# Patient Record
Sex: FEMALE | Race: WHITE | Employment: FULL TIME | URBAN - METROPOLITAN AREA
[De-identification: names, ages, dates, MRNs, and addresses within clinical notes are randomized per-mention and may not be internally consistent; named-entity substitution may affect disease eponyms.]

---

## 2017-01-23 RX ORDER — TRAZODONE HYDROCHLORIDE 50 MG/1
TABLET ORAL
Qty: 30 TABLET | Refills: 11 | Status: SHIPPED | OUTPATIENT
Start: 2017-01-23 | End: 2017-05-26

## 2017-01-24 ENCOUNTER — TELEPHONE (OUTPATIENT)
Dept: DERMATOLOGY CLINIC | Facility: CLINIC | Age: 22
End: 2017-01-24

## 2017-01-24 RX ORDER — GLYCOPYRROLATE 1 MG/1
TABLET ORAL
Qty: 180 TABLET | Refills: 5 | Status: SHIPPED | OUTPATIENT
Start: 2017-01-24 | End: 2017-08-17

## 2017-01-24 NOTE — TELEPHONE ENCOUNTER
Pt last seen in derm 8/9/2016 for acne and hyperhidrosis. Pt asking for refills of Rubinol. May we refill?

## 2017-02-06 ENCOUNTER — PATIENT MESSAGE (OUTPATIENT)
Dept: OBGYN CLINIC | Facility: CLINIC | Age: 22
End: 2017-02-06

## 2017-02-07 NOTE — TELEPHONE ENCOUNTER
From: Joaquin Florez  To: Gagan LING Castro  Sent: 2/6/2017 8:03 PM CST  Subject: Non-Urgent Medical Question    Hi Maricel Stephens gotten my period twice in the span of two weeks now, and it's very heavy with horrible cramps.  It is also painful fee

## 2017-03-08 RX ORDER — VENLAFAXINE HYDROCHLORIDE 150 MG/1
CAPSULE, EXTENDED RELEASE ORAL
Qty: 90 CAPSULE | Refills: 3 | Status: SHIPPED | OUTPATIENT
Start: 2017-03-08 | End: 2017-09-07

## 2017-03-30 ENCOUNTER — OFFICE VISIT (OUTPATIENT)
Dept: PODIATRY CLINIC | Facility: CLINIC | Age: 22
End: 2017-03-30

## 2017-03-30 DIAGNOSIS — B07.0 PLANTAR WARTS: Primary | ICD-10-CM

## 2017-03-30 PROCEDURE — 99213 OFFICE O/P EST LOW 20 MIN: CPT

## 2017-03-30 NOTE — PROGRESS NOTES
HPI:    Patient ID: Abhinav Peña is a 24year old female. Warts  This is a new problem. The current episode started 1 to 4 weeks ago. The problem occurs constantly. The problem has been unchanged.  Pertinent negatives include no abdominal pain, c lesions on face   • Actinic Keratosis Father      possible dysplastic nevi   • Hypertension Other    • Skin cancer Other    • Dementia Other      ALZHEIMERS DISEASE   • Diabetes Other    • Breast Cancer Paternal Grandmother    • Breast Cancer Paternal Aunt and well-nourished. No distress. HENT:   Head: Normocephalic. Cardiovascular:   Pulses:       Dorsalis pedis pulses are 2+ on the right side, and 2+ on the left side. Posterior tibial pulses are 2+ on the right side, and 2+ on the left side.    Mathew Hernandez has been prepared under the direction and in the presence of Анна Savage DPM.   Electronically Signed: Cristiano De Paz, 3/30/2017, 10:18 AM.  I, Анна Savage DPM,  personally performed the services described in this documentation.  All medical record entries ma

## 2017-04-06 RX ORDER — LORAZEPAM 0.5 MG/1
TABLET ORAL
Qty: 60 TABLET | Refills: 3 | Status: SHIPPED
Start: 2017-04-06 | End: 2018-07-05

## 2017-04-06 NOTE — TELEPHONE ENCOUNTER
Imp- Anxiety - improved on venlafaxine ER 75 mg po qD, and lorazepam 0.5 mg po BID prn, refilled #60, 3RF; Rx FAXed

## 2017-04-07 RX ORDER — SUMATRIPTAN 50 MG/1
TABLET, FILM COATED ORAL
Qty: 9 TABLET | Refills: 2 | Status: SHIPPED | OUTPATIENT
Start: 2017-04-07 | End: 2017-09-13

## 2017-04-24 DIAGNOSIS — Z00.00 ANNUAL PHYSICAL EXAM: Primary | ICD-10-CM

## 2017-04-24 RX ORDER — GABAPENTIN 300 MG/1
CAPSULE ORAL
Qty: 90 CAPSULE | Refills: 0 | Status: SHIPPED | OUTPATIENT
Start: 2017-04-24 | End: 2017-05-26

## 2017-04-25 NOTE — TELEPHONE ENCOUNTER
[phone]  Pt made annual px with Dr Viridiana Colunga 5/26.  Pt needs refills and orders for blood work  To clinical

## 2017-04-27 NOTE — TELEPHONE ENCOUNTER
Patient notified that fasting labs ordered by Khushbu Robles, lab order in the system. 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  Patient will be coming in next week for nurse visit to get TB test. To  to

## 2017-05-01 ENCOUNTER — NURSE ONLY (OUTPATIENT)
Dept: INTERNAL MEDICINE CLINIC | Facility: CLINIC | Age: 22
End: 2017-05-01

## 2017-05-01 PROCEDURE — 86580 TB INTRADERMAL TEST: CPT | Performed by: INTERNAL MEDICINE

## 2017-05-23 ENCOUNTER — LAB ENCOUNTER (OUTPATIENT)
Dept: LAB | Age: 22
End: 2017-05-23
Attending: INTERNAL MEDICINE
Payer: COMMERCIAL

## 2017-05-23 DIAGNOSIS — Z00.00 ANNUAL PHYSICAL EXAM: ICD-10-CM

## 2017-05-23 PROCEDURE — 80053 COMPREHEN METABOLIC PANEL: CPT

## 2017-05-23 PROCEDURE — 84443 ASSAY THYROID STIM HORMONE: CPT

## 2017-05-23 PROCEDURE — 85025 COMPLETE CBC W/AUTO DIFF WBC: CPT

## 2017-05-23 PROCEDURE — 36415 COLL VENOUS BLD VENIPUNCTURE: CPT

## 2017-05-26 ENCOUNTER — OFFICE VISIT (OUTPATIENT)
Dept: INTERNAL MEDICINE CLINIC | Facility: CLINIC | Age: 22
End: 2017-05-26

## 2017-05-26 VITALS
TEMPERATURE: 99 F | SYSTOLIC BLOOD PRESSURE: 98 MMHG | WEIGHT: 151 LBS | BODY MASS INDEX: 25.78 KG/M2 | HEART RATE: 68 BPM | DIASTOLIC BLOOD PRESSURE: 64 MMHG | HEIGHT: 64 IN

## 2017-05-26 DIAGNOSIS — R51.9 HEADACHE, UNSPECIFIED HEADACHE TYPE: ICD-10-CM

## 2017-05-26 DIAGNOSIS — G47.00 INSOMNIA, UNSPECIFIED TYPE: ICD-10-CM

## 2017-05-26 DIAGNOSIS — Z00.00 ANNUAL PHYSICAL EXAM: Primary | ICD-10-CM

## 2017-05-26 DIAGNOSIS — K58.9 IRRITABLE BOWEL SYNDROME WITHOUT DIARRHEA: ICD-10-CM

## 2017-05-26 DIAGNOSIS — R61 HYPERHIDROSIS: ICD-10-CM

## 2017-05-26 PROCEDURE — 99395 PREV VISIT EST AGE 18-39: CPT | Performed by: INTERNAL MEDICINE

## 2017-05-26 RX ORDER — GABAPENTIN 300 MG/1
CAPSULE ORAL
Qty: 90 CAPSULE | Refills: 3 | Status: SHIPPED | OUTPATIENT
Start: 2017-05-26 | End: 2017-08-18

## 2017-05-26 RX ORDER — TRAZODONE HYDROCHLORIDE 50 MG/1
TABLET ORAL
Qty: 90 TABLET | Refills: 3 | Status: SHIPPED | OUTPATIENT
Start: 2017-05-26 | End: 2018-06-20

## 2017-05-26 NOTE — PROGRESS NOTES
Abhinav Peña is a 20year old female. HPI:   Patient presents with:  Physical  Migraine: Pt is experiencing migraine headaches and TMJ. She has amitriptyline and Imitrex. She does not feel like they are helping.        21 y/o F here for physical Problem Relation Age of Onset   • Hypertension Mother    • Cancer Father      precanc skin lesions on face   • Actinic Keratosis Father      possible dysplastic nevi   • Hypertension Other    • Skin cancer Other    • Dementia Other      ALZHEIMERS DISEAS Tab TAKE 1 TABLET BY MOUTH EVERY EVENING Disp: 30 tablet Rfl: 11       Allergies:    Dog Dander [Dander]       Reglan [Metoclopram*                ROS:   Constitutional: no weight loss; no fatigue  ENMT:  Negative for ear drainage, hearing loss and nasal d q 3 years; Rx per gynecologist Dr Jim Nolasco (sees Hien Burleson, RILEYN);    Headache  on Neurontin 300 mg po qHS, on amitriptyline 10 mg po qHS, or Imitrex 50 mg po qD prn headache; pt had allergy testing with Dr Soila Ybarra and skin testing to environmental all

## 2017-06-05 ENCOUNTER — APPOINTMENT (OUTPATIENT)
Dept: OTHER | Facility: HOSPITAL | Age: 22
End: 2017-06-05
Attending: FAMILY MEDICINE

## 2017-08-15 ENCOUNTER — PATIENT MESSAGE (OUTPATIENT)
Dept: DERMATOLOGY CLINIC | Facility: CLINIC | Age: 22
End: 2017-08-15

## 2017-08-15 NOTE — TELEPHONE ENCOUNTER
From: Efrain Houston  To: Jennifer Paulson MD  Sent: 8/15/2017 3:14 PM CDT  Subject: Prescription Question    Hi,    I am out of refills of the Glycopyrrolate and used the automated [facility]ill [facility]. I sent this early last week and have not

## 2017-08-16 NOTE — TELEPHONE ENCOUNTER
From: Sharon Hickey  Sent: 8/15/2017 9:39 PM CDT  Subject: Medication Renewal Request    Sharon Hickey would like a refill of the following medications:  glycopyrrolate 1 MG Oral Tab Cristal Gilbert MD]    Preferred pharmacy: [facility]

## 2017-08-17 RX ORDER — GLYCOPYRROLATE 1 MG/1
TABLET ORAL
Qty: 180 TABLET | Refills: 5
Start: 2017-08-17

## 2017-08-17 RX ORDER — GLYCOPYRROLATE 1 MG/1
TABLET ORAL
Qty: 180 TABLET | Refills: 0 | Status: SHIPPED | OUTPATIENT
Start: 2017-08-17 | End: 2017-09-19

## 2017-08-17 NOTE — TELEPHONE ENCOUNTER
Per LSS's original Rx that she gave written order to reorder glycopyrrolate Rx from 1/24/2017.   Rx for glycopyrrolate 1 mg tab, take 3 tablets by mouth 2 times a day, #180, 0 [facility]

## 2017-08-18 ENCOUNTER — OFFICE VISIT (OUTPATIENT)
Dept: INTERNAL MEDICINE CLINIC | Facility: CLINIC | Age: 22
End: 2017-08-18

## 2017-08-18 VITALS
HEART RATE: 80 BPM | BODY MASS INDEX: 27 KG/M2 | WEIGHT: 158 LBS | DIASTOLIC BLOOD PRESSURE: 68 MMHG | TEMPERATURE: 99 F | SYSTOLIC BLOOD PRESSURE: 94 MMHG

## 2017-08-18 DIAGNOSIS — G47.00 INSOMNIA, UNSPECIFIED TYPE: ICD-10-CM

## 2017-08-18 DIAGNOSIS — M70.50 ANSERINE BURSITIS: Primary | ICD-10-CM

## 2017-08-18 DIAGNOSIS — M79.7 FIBROMYALGIA: ICD-10-CM

## 2017-08-18 PROCEDURE — 99212 OFFICE O/P EST SF 10 MIN: CPT | Performed by: INTERNAL MEDICINE

## 2017-08-18 PROCEDURE — 99214 OFFICE O/P EST MOD 30 MIN: CPT | Performed by: INTERNAL MEDICINE

## 2017-08-18 RX ORDER — GABAPENTIN 300 MG/1
CAPSULE ORAL
Qty: 180 CAPSULE | Refills: 3 | Status: SHIPPED | OUTPATIENT
Start: 2017-08-18 | End: 2018-07-23

## 2017-08-18 NOTE — PROGRESS NOTES
Rickie Corrales is a 20year old female.     HPI:   Patient presents with:  Knee Pain      21 y/o M with 6 d h/o bilateral knee pain; pt had started running; she would run about a mile; pain in bilateral knees along medial aspect of knee; taking Aleve Smokeless tobacco: Never Used                      Comment: No household smokers.    Alcohol use: Yes           0.0 oz/week     Comment: 5 beers/month       Medications (Active prior to today's visit):    Current Outpatient [address] [place] [address] breastfeeding.   Constitutional: alert and oriented x3 in no acute distress  HEENT- EOMI, PERRL  Nose/Mouth/Throat: pharynx without erythema; no oral lesions  Neck/Thyroid: neck supple; no thyromegaly  Cardiovascular: RRR, S1, S2, no S3 or murmur  Respirato This Visit:    Signed Prescriptions Disp Refills    gabapentin 300 MG Oral Cap 180 capsule 3      Sig: TAKE TWO CAPSULES (=600mg)  BY MOUTH EVERY EVENING           Imaging & Referrals:  None     8/18/2017  Viktor Jackman MD

## 2017-09-07 ENCOUNTER — TELEPHONE (OUTPATIENT)
Dept: INTERNAL MEDICINE CLINIC | Facility: CLINIC | Age: 22
End: 2017-09-07

## 2017-09-07 RX ORDER — VENLAFAXINE HYDROCHLORIDE 225 MG/1
225 TABLET, EXTENDED RELEASE ORAL DAILY
Qty: 30 TABLET | Refills: 5 | Status: SHIPPED | OUTPATIENT
Start: 2017-09-07 | End: 2017-11-30

## 2017-09-07 NOTE — TELEPHONE ENCOUNTER
Message received via [employer]; had been on venlafaxine  mg po qD;  Imp- anxiety; rec- increase venlafaxine ER to 225 mg po qD #30, 5RF; pt called; ERx sent    -----------------------     ----- Message from Jackelin Myers to Meryle Raveling, MD

## 2017-09-14 RX ORDER — SUMATRIPTAN 50 MG/1
TABLET, FILM COATED ORAL
Qty: 9 TABLET | Refills: 3 | Status: SHIPPED | OUTPATIENT
Start: 2017-09-14 | End: 2020-05-26

## 2017-09-19 ENCOUNTER — OFFICE VISIT (OUTPATIENT)
Dept: DERMATOLOGY CLINIC | Facility: CLINIC | Age: 22
End: 2017-09-19

## 2017-09-19 DIAGNOSIS — R61 HYPERHIDROSIS: ICD-10-CM

## 2017-09-19 DIAGNOSIS — B07.0 PLANTAR WART: Primary | ICD-10-CM

## 2017-09-19 PROCEDURE — 99212 OFFICE O/P EST SF 10 MIN: CPT | Performed by: DERMATOLOGY

## 2017-09-19 RX ORDER — GLYCOPYRROLATE 1 MG/1
TABLET ORAL
Qty: 90 TABLET | Refills: 11 | Status: SHIPPED | OUTPATIENT
Start: 2017-09-19 | End: 2018-10-13

## 2017-09-19 NOTE — PROGRESS NOTES
HPI:     Chief Complaint     Derm Problem; Warts        HPI     Derm Problem    Additional comments: Patient requesting refill of glycopyrrolate           Warts    Additional comments: LOV 8/19/16. Patient with \"15\" warts to plantar feet.  Cryo done in [place] 11/13/15 Disp: 1 each Rfl: 0   Multiple Vitamin (MULTI-VITAMIN) Oral Tab Take 1 tablet by mouth daily. Disp:  Rfl:    Aspirin-Acetaminophen-Caffeine (EXCEDRIN MIGRAINE OR) Take 1 tablet by mouth as needed.  Disp:  Rfl:      Allergies:     Dog Dander Harika Bah Pt has a defibrillator No    Reaction to local anesthetic No     Social History Narrative    The patient does not use an assistive device. .      The patient does live in a home with stairs.          Family History   Problem Relation Age of Onset   • Hype

## 2017-10-04 ENCOUNTER — TELEPHONE (OUTPATIENT)
Dept: DERMATOLOGY CLINIC | Facility: CLINIC | Age: 22
End: 2017-10-04

## 2017-10-04 NOTE — TELEPHONE ENCOUNTER
RX for glycopyrrolate was just sent on 9/19/17 - confirmed with pharmacy - they will fill this for pt, no refills needed

## 2017-10-09 ENCOUNTER — PATIENT MESSAGE (OUTPATIENT)
Dept: OBGYN CLINIC | Facility: CLINIC | Age: 22
End: 2017-10-09

## 2017-10-10 ENCOUNTER — TELEPHONE (OUTPATIENT)
Dept: OBGYN CLINIC | Facility: CLINIC | Age: 22
End: 2017-10-10

## 2017-10-10 NOTE — TELEPHONE ENCOUNTER
From: Cheri Johnson  To: LING Ordaz  Sent: 10/9/2017 9:00 PM CDT  Subject: Prescription Question    Hi Rosio,    For the past six months, I have had my period almost nonstop and my hormones have been all out of whack.  I am thinking I want

## 2017-10-10 NOTE — TELEPHONE ENCOUNTER
Pt set for annual on 11/15 at 5:40pm [facility]. Would like to know if nexplanon can be removed during appt.

## 2017-10-24 ENCOUNTER — TELEPHONE (OUTPATIENT)
Dept: INTERNAL MEDICINE CLINIC | Facility: CLINIC | Age: 22
End: 2017-10-24

## 2017-10-25 NOTE — TELEPHONE ENCOUNTER
Pt reported that she has still been taking amitriptyline 10mg qHS; she does not recall discussion about stopping this medication.  She stated that she is open to stopping it as she continues to experience frequent headaches and does not think it's helping m

## 2017-10-25 NOTE — TELEPHONE ENCOUNTER
Refill request received for amitriptyline. Per 8/18/17 OV note, the pt was advised to stop amitriptyline. Attempted to call patient to clarify the refill request. Teri Morales.

## 2017-10-26 RX ORDER — AMITRIPTYLINE HYDROCHLORIDE 10 MG/1
TABLET, FILM COATED ORAL
Qty: 90 TABLET | Refills: 3 | OUTPATIENT
Start: 2017-10-26

## 2017-11-07 ENCOUNTER — TELEPHONE (OUTPATIENT)
Dept: OBGYN CLINIC | Facility: CLINIC | Age: 22
End: 2017-11-07

## 2017-11-07 NOTE — TELEPHONE ENCOUNTER
Appt for nexplanon removal scheduled for 12/4 at 1020. Pt has annual scheduled for 12/4 at 1040. LM to check [employer] for appt info.

## 2017-11-07 NOTE — TELEPHONE ENCOUNTER
Pt has a annual on 12/4 pt was wondering if she can have nexplanon removal at that appt as well. If not pt would like to know if she can be seen same day back to back. Please advise.

## 2017-11-21 ENCOUNTER — OFFICE VISIT (OUTPATIENT)
Dept: INTERNAL MEDICINE CLINIC | Facility: CLINIC | Age: 22
End: 2017-11-21

## 2017-11-21 VITALS
TEMPERATURE: 99 F | OXYGEN SATURATION: 98 % | SYSTOLIC BLOOD PRESSURE: 120 MMHG | WEIGHT: 159 LBS | HEART RATE: 77 BPM | BODY MASS INDEX: 27.14 KG/M2 | HEIGHT: 64 IN | DIASTOLIC BLOOD PRESSURE: 72 MMHG

## 2017-11-21 DIAGNOSIS — L30.9 ECZEMA, UNSPECIFIED TYPE: ICD-10-CM

## 2017-11-21 DIAGNOSIS — H60.392 OTHER INFECTIVE ACUTE OTITIS EXTERNA OF LEFT EAR: Primary | ICD-10-CM

## 2017-11-21 PROCEDURE — 99212 OFFICE O/P EST SF 10 MIN: CPT | Performed by: INTERNAL MEDICINE

## 2017-11-21 PROCEDURE — 99213 OFFICE O/P EST LOW 20 MIN: CPT | Performed by: INTERNAL MEDICINE

## 2017-11-21 RX ORDER — CIPROFLOXACIN AND DEXAMETHASONE 3; 1 MG/ML; MG/ML
4 SUSPENSION/ DROPS AURICULAR (OTIC) 2 TIMES DAILY
Qty: 1 BOTTLE | Refills: 0 | Status: SHIPPED | OUTPATIENT
Start: 2017-11-21 | End: 2018-08-21

## 2017-11-21 NOTE — PROGRESS NOTES
Sky Hernández is a 20year old female. Patient presents with:  Ear Problem: 2  days ago she reports that she took a shower and cleaned her right ear with a Q-tip and removed dry crusty blood. Also noted dry crusty blood in left ear.  reports ears feel (=600mg)  BY MOUTH EVERY EVENING Disp: 180 capsule Rfl: 3   TraZODone HCl 50 MG Oral Tab TAKE 1 TABLET BY MOUTH EVERY EVENING Disp: 90 tablet Rfl: 3   LORAZEPAM 0.5 MG Oral Tab TAKE 1 TABLET BY MOUTH TWICE A DAY AS NEEDED FOR ANXIETY Disp: 60 tablet Rfl: 3 Other    • Skin cancer Other    • Dementia Other      ALZHEIMERS DISEASE   • Diabetes Other    • Breast Cancer Paternal Aunt 54   • Cancer Paternal Uncle      TESTICULAR CANCER      Social History:   Smoking status: Never Smoker

## 2017-11-30 ENCOUNTER — OFFICE VISIT (OUTPATIENT)
Dept: OBGYN CLINIC | Facility: CLINIC | Age: 22
End: 2017-11-30

## 2017-11-30 VITALS
SYSTOLIC BLOOD PRESSURE: 116 MMHG | WEIGHT: 159.81 LBS | DIASTOLIC BLOOD PRESSURE: 84 MMHG | HEART RATE: 95 BPM | BODY MASS INDEX: 27 KG/M2

## 2017-11-30 DIAGNOSIS — Z30.09 ENCOUNTER FOR COUNSELING REGARDING CONTRACEPTION: ICD-10-CM

## 2017-11-30 DIAGNOSIS — Z01.419 WELL WOMAN EXAM WITH ROUTINE GYNECOLOGICAL EXAM: Primary | ICD-10-CM

## 2017-11-30 DIAGNOSIS — N89.8 VAGINAL ITCHING: ICD-10-CM

## 2017-11-30 PROCEDURE — 99395 PREV VISIT EST AGE 18-39: CPT | Performed by: CLINICAL NURSE SPECIALIST

## 2017-11-30 RX ORDER — FLUCONAZOLE 150 MG/1
150 TABLET ORAL ONCE
Qty: 2 TABLET | Refills: 0 | Status: SHIPPED | OUTPATIENT
Start: 2017-11-30 | End: 2017-11-30

## 2017-11-30 RX ORDER — VENLAFAXINE HYDROCHLORIDE 150 MG/1
150 CAPSULE, EXTENDED RELEASE ORAL
Refills: 3 | COMMUNITY
Start: 2017-09-02 | End: 2018-01-15 | Stop reason: DRUGHIGH

## 2017-11-30 RX ORDER — METRONIDAZOLE 7.5 MG/G
1 GEL VAGINAL NIGHTLY
Qty: 1 TUBE | Refills: 0 | Status: SHIPPED | OUTPATIENT
Start: 2017-11-30 | End: 2017-12-05

## 2017-12-08 NOTE — PROGRESS NOTES
Sharon Hickey is a 20year old female [facility] Patient's last menstrual period was 11/07/2017. Patient presents with:  Gyn Problem: discharge, itching  Gyn Exam: annual  Last annual exam was 10/13/16.  Last pap was 4/28/15 and was normal. Denies hx of status: Single  Spouse name: N/A    Years of education: N/A  Number of children: N/A     Occupational History  None on file     Social History Main Topics   Smoking status: Never Smoker    Smokeless tobacco: Never Used    Comment: No household smokers. EVERY EVENING, Disp: 90 tablet, Rfl: 3  •  LORAZEPAM 0.5 MG Oral Tab, TAKE 1 TABLET BY MOUTH TWICE A DAY AS NEEDED FOR ANXIETY, Disp: 60 tablet, Rfl: 3  •  Etonogestrel (NEXPLANON) 68 MG Subcutaneous Implant, Implanted by Savannah Gallardo on 11/13/15, Disp: 1 in size, location, without lesions and prolapse  Bladder:  No fullness, masses or tenderness  Vagina:  Normal appearance without lesions, thick yellow, clumpy discharge  Cervix:  Normal without tenderness on motion  Uterus: normal in size, contour, positio

## 2017-12-11 ENCOUNTER — NURSE ONLY (OUTPATIENT)
Dept: OBGYN CLINIC | Facility: CLINIC | Age: 22
End: 2017-12-11

## 2017-12-11 VITALS
HEART RATE: 71 BPM | DIASTOLIC BLOOD PRESSURE: 77 MMHG | SYSTOLIC BLOOD PRESSURE: 119 MMHG | WEIGHT: 158 LBS | BODY MASS INDEX: 27 KG/M2

## 2017-12-11 DIAGNOSIS — Z30.46 NEXPLANON REMOVAL: Primary | ICD-10-CM

## 2017-12-11 PROCEDURE — 11976 REMOVE CONTRACEPTIVE CAPSULE: CPT | Performed by: CLINICAL NURSE SPECIALIST

## 2017-12-11 RX ORDER — FLUCONAZOLE 150 MG/1
150 TABLET ORAL ONCE
Qty: 1 TABLET | Refills: 1 | Status: SHIPPED | OUTPATIENT
Start: 2017-12-11 | End: 2017-12-11

## 2017-12-11 NOTE — PROCEDURES
Nexplanon Insertion/Removal    Pregnancy Results: negative from {EM ENDOBX VEWV:[id number]} test   Birth control method(s) used:  ; date last used:    Consent was obtained from the patient.     Removal:  {NUMBERS 0-10:3282} % lidocaine with Epinephrine was in

## 2017-12-11 NOTE — PROCEDURES
Nexplanon Removal    Pregnancy Results: negative from urine test   Birth control method(s) used: Nexplanon   Consent was obtained from the patient.     Removal:  2 % lidocaine with Epinephrine was injected underneath the tip of the Nexplanon fazal that is con

## 2018-01-11 ENCOUNTER — TELEPHONE (OUTPATIENT)
Dept: PEDIATRICS CLINIC | Facility: CLINIC | Age: 23
End: 2018-01-11

## 2018-01-11 DIAGNOSIS — Z32.00 PREGNANCY EXAMINATION OR TEST, PREGNANCY UNCONFIRMED: Primary | ICD-10-CM

## 2018-01-11 RX ORDER — MISOPROSTOL 200 UG/1
400 TABLET ORAL ONCE
Qty: 2 TABLET | Refills: 0 | Status: SHIPPED | OUTPATIENT
Start: 2018-01-11 | End: 2018-01-11

## 2018-01-11 NOTE — TELEPHONE ENCOUNTER
Pt calling to report that she started her period last night and is calling to schedule Mirena IUD insertion with [facility].  Pt had Nexplanon removed with [facility] in December and at time of visit [facility] discussed Mirena IUD and pt was told to call on first day of cycle

## 2018-01-11 NOTE — TELEPHONE ENCOUNTER
Pt was told to call when she got her menses to insert an iud. Pt aware [facility] wont be in till Monday. Pt wondering if ok to wait till then.

## 2018-01-14 ENCOUNTER — APPOINTMENT (OUTPATIENT)
Dept: LAB | Facility: HOSPITAL | Age: 23
End: 2018-01-14
Attending: CLINICAL NURSE SPECIALIST
Payer: COMMERCIAL

## 2018-01-14 DIAGNOSIS — Z32.00 PREGNANCY EXAMINATION OR TEST, PREGNANCY UNCONFIRMED: ICD-10-CM

## 2018-01-14 LAB — HCG SERPL QL: NEGATIVE

## 2018-01-14 PROCEDURE — 36415 COLL VENOUS BLD VENIPUNCTURE: CPT

## 2018-01-14 PROCEDURE — 84703 CHORIONIC GONADOTROPIN ASSAY: CPT

## 2018-01-15 ENCOUNTER — OFFICE VISIT (OUTPATIENT)
Dept: OBGYN CLINIC | Facility: CLINIC | Age: 23
End: 2018-01-15

## 2018-01-15 VITALS
BODY MASS INDEX: 27 KG/M2 | WEIGHT: 155.81 LBS | DIASTOLIC BLOOD PRESSURE: 79 MMHG | HEART RATE: 88 BPM | SYSTOLIC BLOOD PRESSURE: 136 MMHG

## 2018-01-15 DIAGNOSIS — Z30.430 ENCOUNTER FOR INSERTION OF INTRAUTERINE CONTRACEPTIVE DEVICE: Primary | ICD-10-CM

## 2018-01-15 PROCEDURE — 58300 INSERT INTRAUTERINE DEVICE: CPT | Performed by: CLINICAL NURSE SPECIALIST

## 2018-01-15 RX ORDER — VENLAFAXINE HYDROCHLORIDE 225 MG/1
TABLET, EXTENDED RELEASE ORAL
Refills: 5 | COMMUNITY
Start: 2017-12-31 | End: 2018-03-06

## 2018-01-15 NOTE — PROCEDURES
IUD Insertion     Pregnancy Results: negative from blood test   Birth control method(s) used: CONDOMS    Consent signed. Procedure discussed with the patient in detail including indication, risks, benefits, alternatives and complications.     Pelvic Exam F

## 2018-01-23 ENCOUNTER — PATIENT MESSAGE (OUTPATIENT)
Dept: INTERNAL MEDICINE CLINIC | Facility: CLINIC | Age: 23
End: 2018-01-23

## 2018-01-23 RX ORDER — TRAZODONE HYDROCHLORIDE 50 MG/1
TABLET ORAL
Qty: 30 TABLET | Refills: 11 | OUTPATIENT
Start: 2018-01-23

## 2018-01-24 NOTE — TELEPHONE ENCOUNTER
From: Deric Orourke  To: Manuel Garza MD  Sent: 1/23/2018 7:15 PM CST  Subject: Prescription Question    Hi,    I just saw that my prescription refill request for Trazodone was denied and am wondering why.     Thank you,  Akilah Good

## 2018-01-24 NOTE — TELEPHONE ENCOUNTER
Refill request received for trazodone 50mg from [facility]. Refill auth last sent 5/26/17 to [facility] #[id number] with 3 refills:    Disp Refills Start End     TraZODone HCl 50 MG Oral Tab 90 tablet 3 5/26/2017     Sig: TAKE 1 TABLET BY MOUTH EVERY EVENING    [facility] [address]

## 2018-02-07 ENCOUNTER — OFFICE VISIT (OUTPATIENT)
Dept: GASTROENTEROLOGY | Facility: CLINIC | Age: 23
End: 2018-02-07

## 2018-02-07 ENCOUNTER — TELEPHONE (OUTPATIENT)
Dept: GASTROENTEROLOGY | Facility: CLINIC | Age: 23
End: 2018-02-07

## 2018-02-07 VITALS
BODY MASS INDEX: 26.31 KG/M2 | HEIGHT: 64.5 IN | DIASTOLIC BLOOD PRESSURE: 80 MMHG | HEART RATE: 81 BPM | WEIGHT: 156 LBS | SYSTOLIC BLOOD PRESSURE: 126 MMHG

## 2018-02-07 DIAGNOSIS — Z87.19 HISTORY OF IRRITABLE BOWEL SYNDROME: ICD-10-CM

## 2018-02-07 DIAGNOSIS — K59.09 CHRONIC CONSTIPATION: Primary | ICD-10-CM

## 2018-02-07 DIAGNOSIS — R10.84 ABDOMINAL PAIN, CHRONIC, GENERALIZED: ICD-10-CM

## 2018-02-07 DIAGNOSIS — R10.84 GENERALIZED ABDOMINAL PAIN: ICD-10-CM

## 2018-02-07 DIAGNOSIS — G89.29 ABDOMINAL PAIN, CHRONIC, GENERALIZED: ICD-10-CM

## 2018-02-07 PROCEDURE — 99244 OFF/OP CNSLTJ NEW/EST MOD 40: CPT | Performed by: INTERNAL MEDICINE

## 2018-02-07 RX ORDER — CALCIUM POLYCARBOPHIL 625 MG
TABLET ORAL
COMMUNITY
End: 2018-07-23

## 2018-02-07 NOTE — PROGRESS NOTES
HPI:    Patient ID: Cherylene Hitt is a 20year old female. HPI    Review of Systems   Constitutional: Negative for activity change, appetite change, chills, diaphoresis, fatigue, fever and unexpected weight change.    HENT: Negative for congestion 1 TABLET BY MOUTH EVERY EVENING Disp: 90 tablet Rfl: 3   LORAZEPAM 0.5 MG Oral Tab TAKE 1 TABLET BY MOUTH TWICE A DAY AS NEEDED FOR ANXIETY Disp: 60 tablet Rfl: 3   Multiple Vitamin (MULTI-VITAMIN) Oral Tab Take 1 tablet by mouth daily.  Disp:  Rfl:    Aspi warm and dry. No rash noted. She is not diaphoretic. No erythema. No pallor. Psychiatric: She has a normal mood and affect. Her behavior is normal. Judgment and thought content normal.   Nursing note and vitals reviewed.              ASSESSMENT/PLAN:   Richard

## 2018-02-07 NOTE — PATIENT INSTRUCTIONS
1.  Stop MVI. 2.  Start Miralax 1 capful bid, if not helpful then 2 caps bid    3.   Schedule colonoscopy with Golytely preparation, split dose and MAC at [facility]

## 2018-02-07 NOTE — H&P
History of Present Illness: This is a very pleasant 50-year-old female referred by Dr. Bev Alonzo as an evaluation for chronic constipation, history of IBS, and abdominal pain. The patient describes not having bowel movements for up to 3 weeks.   This is increase to 2 capsules twice daily. Tubing factors may include some of her medications including multivitamins, trazodone, and venlafaxine. I have advised her to stop the multivitamin for now.     Colonoscopy consent: I have discussed the risks, benefits,

## 2018-02-12 ENCOUNTER — OFFICE VISIT (OUTPATIENT)
Dept: OBGYN CLINIC | Facility: CLINIC | Age: 23
End: 2018-02-12

## 2018-02-12 VITALS
BODY MASS INDEX: 26 KG/M2 | HEART RATE: 87 BPM | DIASTOLIC BLOOD PRESSURE: 74 MMHG | SYSTOLIC BLOOD PRESSURE: 120 MMHG | WEIGHT: 156 LBS

## 2018-02-12 DIAGNOSIS — Z30.431 IUD CHECK UP: Primary | ICD-10-CM

## 2018-02-12 PROCEDURE — 99213 OFFICE O/P EST LOW 20 MIN: CPT | Performed by: CLINICAL NURSE SPECIALIST

## 2018-02-12 NOTE — PROGRESS NOTES
Deric Orourke is a 20year old female  No LMP recorded. Patient is not currently having periods (Reason: IUD - Intrauterine Device). Patient presents with: Follow - Up: IUD   Had Mirena placed 1/15/18. Happy with method.  Denies any pelvic pa possible dysplastic nevi   • Breast Cancer Paternal Grandmother    • Hypertension Other    • Skin cancer Other    • Dementia Other      ALZHEIMERS DISEASE   • Diabetes Other    • Breast Cancer Paternal Aunt 54   • Cancer Paternal Uncle      TESTICULAR CANC 200 MG IN 24 HRS). , Disp: 9 tablet, Rfl: 3  •  gabapentin 300 MG Oral Cap, TAKE TWO CAPSULES (=600mg)  BY MOUTH EVERY EVENING, Disp: 180 capsule, Rfl: 3  •  TraZODone HCl 50 MG Oral Tab, TAKE 1 TABLET BY MOUTH EVERY EVENING, Disp: 90 tablet, Rfl: 3  •  EMMANUEL

## 2018-02-19 ENCOUNTER — PATIENT MESSAGE (OUTPATIENT)
Dept: GASTROENTEROLOGY | Facility: CLINIC | Age: 23
End: 2018-02-19

## 2018-02-19 NOTE — TELEPHONE ENCOUNTER
From: Ina Duong  To: Kayden Urban MD  Sent: 2/19/2018 10:21 AM CST  Subject: Prescription Question    Hi,    I am scheduled for a colonoscopy on Wednesday and am just double checking that the prep was sent to my pharmacy.  I haven'

## 2018-02-20 ENCOUNTER — TELEPHONE (OUTPATIENT)
Dept: GASTROENTEROLOGY | Facility: CLINIC | Age: 23
End: 2018-02-20

## 2018-02-20 NOTE — TELEPHONE ENCOUNTER
Pharm calling to inform that the Peg-335- is not avail.  no longer has. Please send in new Rx for another med.

## 2018-02-20 NOTE — TELEPHONE ENCOUNTER
Spoke to the pharmacist. She is going to use the Colyte 236 w/flavor packs    I left a message for the pt that she can call her pharmacy to find out when the colyte is available for

## 2018-02-20 NOTE — TELEPHONE ENCOUNTER
Pt. states that the pharm is not able to fill her Rx for Colyte, as the med is no longer avail., Pt. States that a New Rx will need to be sent for another prep solution. Pt. States that she needs to start taking her prep this afternoon.

## 2018-02-21 ENCOUNTER — SURGERY (OUTPATIENT)
Age: 23
End: 2018-02-21

## 2018-02-21 ENCOUNTER — HOSPITAL ENCOUNTER (OUTPATIENT)
Age: 23
Setting detail: HOSPITAL OUTPATIENT SURGERY
Discharge: HOME | End: 2018-02-21
Attending: INTERNAL MEDICINE | Admitting: INTERNAL MEDICINE
Payer: COMMERCIAL

## 2018-02-21 ENCOUNTER — ANESTHESIA (OUTPATIENT)
Dept: ENDOSCOPY | Age: 23
End: 2018-02-21
Payer: COMMERCIAL

## 2018-02-21 ENCOUNTER — ANESTHESIA EVENT (OUTPATIENT)
Dept: ENDOSCOPY | Age: 23
End: 2018-02-21
Payer: COMMERCIAL

## 2018-02-21 VITALS
DIASTOLIC BLOOD PRESSURE: 80 MMHG | WEIGHT: 157 LBS | SYSTOLIC BLOOD PRESSURE: 120 MMHG | RESPIRATION RATE: 18 BRPM | OXYGEN SATURATION: 100 % | HEIGHT: 64 IN | BODY MASS INDEX: 26.8 KG/M2 | HEART RATE: 80 BPM

## 2018-02-21 DIAGNOSIS — K59.09 CHRONIC CONSTIPATION: ICD-10-CM

## 2018-02-21 DIAGNOSIS — R10.84 GENERALIZED ABDOMINAL PAIN: ICD-10-CM

## 2018-02-21 PROBLEM — Q43.8 TORTUOUS COLON: Status: ACTIVE | Noted: 2018-02-21

## 2018-02-21 PROBLEM — K64.8 INTERNAL HEMORRHOIDS: Status: ACTIVE | Noted: 2018-02-21

## 2018-02-21 LAB — B-HCG UR QL: NEGATIVE

## 2018-02-21 PROCEDURE — 45378 DIAGNOSTIC COLONOSCOPY: CPT | Performed by: INTERNAL MEDICINE

## 2018-02-21 RX ORDER — NALOXONE HYDROCHLORIDE 0.4 MG/ML
80 INJECTION, SOLUTION INTRAMUSCULAR; INTRAVENOUS; SUBCUTANEOUS AS NEEDED
Status: CANCELLED | OUTPATIENT
Start: 2018-02-21 | End: 2018-02-21

## 2018-02-21 RX ORDER — SODIUM CHLORIDE, SODIUM LACTATE, POTASSIUM CHLORIDE, CALCIUM CHLORIDE 600; 310; 30; 20 MG/100ML; MG/100ML; MG/100ML; MG/100ML
INJECTION, SOLUTION INTRAVENOUS CONTINUOUS
Status: CANCELLED | OUTPATIENT
Start: 2018-02-21

## 2018-02-21 NOTE — H&P
History & Physical Examination    Patient Name: Tea oJhn  MRN: [id number]  [facility]: [id number]  YOB: 1995    Diagnosis: Abdominal pain, change in bowel habits, rule out IBD      Prescriptions Prior to Admission:  PEG 3350-[id number]-NaC FIBROMYALGIA.  RESOLVED WITH INCREASED WATER INTAKE   • Cyst of joint of ankle or foot     DRAINED   • Dysmenorrhea     OCP's   • Dysplastic nevus 2014    compound dysplastic nevus, mild, skin of left temporal hairline, 2014   • Fibromyalgia    • Ganglion c [facility]  2/21/2018  8:04 AM

## 2018-02-21 NOTE — OPERATIVE REPORT
[facility]    PATIENT'S NAME: Trish Myers   ATTENDING PHYSICIAN: Arnav Fontana MD   OPERATING PHYSICIAN: Arnav Fontana MD   PATIENT ACCOUNT#:   [id number]    LOCATION:  [address],[facility] 1 ENDO POOL ROOMS [address] [place] [address] internal hemorrhoids. RECOMMENDATION:    1. MiraLAX p.r.n. and Dulcolax suppositories. 2.   If it is not helpful, then consider Linzess for IBS-related constipation.     Dictated By Kian Connor MD  d: 02/21/2018 08:33:56  t: 02/21/20

## 2018-02-21 NOTE — BRIEF OP NOTE
Pre-Operative Diagnosis: Generalized abdominal pain, Change in bowel habits, rule out IBD     Post-Operative Diagnosis: Tortuous colon, internal hemorrhoids     Procedure Performed:   Procedure(s):  Colonoscopy    Surgeon(s) and Role:     * Godwin Witt

## 2018-02-21 NOTE — ANESTHESIA PREPROCEDURE EVALUATION
Anesthesia PreOp Note    HPI:     Yareli Pratt is a 20year old female who presents for preoperative consultation requested by: Brant Mcconnell MD    Date of Surgery: 2/21/2018    Procedure(s):  COLONOSCOPY  Indication: Generalized abdo 01-  2011: FOOT SURGERY Bilateral      Comment: foot aspiration of cysts on both feet  2012: OTHER SURGICAL HISTORY      Comment: ganglion cyst removal, both feet  No date: OTHER SURGICAL HISTORY Right      Comment: Right ear drum rupture      Presc possible dysplastic nevi   • Breast Cancer Paternal Grandmother    • Hypertension Other    • Skin cancer Other    • Dementia Other      ALZHEIMERS DISEASE   • Diabetes Other    • Breast Cancer Paternal Aunt 54   • Cancer Paternal Uncle      TESTICULAR CANC exam             Anesthesia Plan:   ASA:  1  Plan:   MAC  Informed Consent Plan and Risks Discussed With:  Patient  Discussed plan with:  Surgeon      I have informed Deric Orourke  of the nature of the anesthetic plan, benefits, risks, major compli

## 2018-03-01 ENCOUNTER — TELEPHONE (OUTPATIENT)
Dept: GASTROENTEROLOGY | Facility: CLINIC | Age: 23
End: 2018-03-01

## 2018-03-01 NOTE — TELEPHONE ENCOUNTER
Please inform patient that I sent her Linzess prescription. She should try it for 1 month and get back with us. She should still use MiraLAX as needed if the Linzess does not work.

## 2018-03-02 ENCOUNTER — TELEPHONE (OUTPATIENT)
Dept: GASTROENTEROLOGY | Facility: CLINIC | Age: 23
End: 2018-03-02

## 2018-03-02 NOTE — TELEPHONE ENCOUNTER
Initiated PA through [facility] with [id number] and should receive and determination within 72 hours

## 2018-03-02 NOTE — TELEPHONE ENCOUNTER
[facility] pt requesting prior auth for rx:Linzess, the ins PH#[phone] - #[phone], any questions call pharm at:[phone],thanks.     Current Outpatient Prescriptions:   •  Linaclotide (LINZESS) 145 MCG Oral Cap, Take 1 capsule

## 2018-03-02 NOTE — TELEPHONE ENCOUNTER
Spoke to pt. Relayed Dr. Dorrine Lefort message as shown below. Pt verbalized understanding of whole message and had no further questions at this time.

## 2018-03-05 NOTE — TELEPHONE ENCOUNTER
Received fax from [address] [place] [address] indicating that [place] for Steph Castillo was denied. Denial reason:  \"linzess 145 mcg capsule, use as directed (#2 capsules per day), is denied for medical necessity. Your plan allows you to receive #1 capsule per day.  Medication author

## 2018-03-06 RX ORDER — VENLAFAXINE HYDROCHLORIDE 225 MG/1
225 TABLET, EXTENDED RELEASE ORAL DAILY
Qty: 30 TABLET | Refills: 11 | Status: SHIPPED | OUTPATIENT
Start: 2018-03-06 | End: 2018-07-23

## 2018-03-06 NOTE — TELEPHONE ENCOUNTER
I spoke to the pt.  She states she got a call from [facility] stating the Annabella Reece was approved for one capsule daily

## 2018-03-07 ENCOUNTER — TELEPHONE (OUTPATIENT)
Dept: GASTROENTEROLOGY | Facility: CLINIC | Age: 23
End: 2018-03-07

## 2018-03-07 NOTE — TELEPHONE ENCOUNTER
JUSTINA RN's - this msg was sent to the scheduling pool. Re-routed to JUSTINA RN's to advise on PA for pt's Linzess. Thank you.

## 2018-03-08 NOTE — TELEPHONE ENCOUNTER
If this is non-urgent, let us wait to ask Dr. Vanessa García on her recommendation regarding this patient of hers.

## 2018-03-08 NOTE — TELEPHONE ENCOUNTER
See TE from 3/2/18 as well. Called [facility] pharmacy and spoke to Bianca. She was able to clarify for me that a PA is needed for linzess BID. I have already tried to get a PA for BID and it was denied.  Bianca was able to run it for once daily and she stated

## 2018-03-08 NOTE — TELEPHONE ENCOUNTER
Noted, message routed to Dr. David Lopez to address upon her arrival per [facility] recommendation below.

## 2018-03-26 ENCOUNTER — TELEPHONE (OUTPATIENT)
Dept: INTERNAL MEDICINE CLINIC | Facility: CLINIC | Age: 23
End: 2018-03-26

## 2018-03-26 NOTE — TELEPHONE ENCOUNTER
Pt. Has been experiencing lower abdominal pain she can only come in in the morning she works at noon. Pt. Has an appt.  On 03/29 with Dr. Chung Notice is it ok to wait  Please advise  Ph. # [phone]   Routed to clinical

## 2018-03-26 NOTE — TELEPHONE ENCOUNTER
Please advise - called patient who states she started noticing Saturday night -feeling pressure when sittin gor standing up, more in the right lower abdomen ( ovary) no cramps, its getting more persistent since Saturday , denies fever has ernie 3/29 - to

## 2018-03-26 NOTE — TELEPHONE ENCOUNTER
Left message for patient that in my opinion she should get that checked in the emergency room. I would be concerned about appendicitis or other infectious problems. I asked patient to call back the office when she receives this message.

## 2018-03-27 ENCOUNTER — HOSPITAL ENCOUNTER (EMERGENCY)
Facility: HOSPITAL | Age: 23
Discharge: HOME | End: 2018-03-28
Attending: EMERGENCY MEDICINE
Payer: COMMERCIAL

## 2018-03-27 ENCOUNTER — APPOINTMENT (OUTPATIENT)
Dept: ULTRASOUND IMAGING | Facility: HOSPITAL | Age: 23
End: 2018-03-27
Attending: EMERGENCY MEDICINE
Payer: COMMERCIAL

## 2018-03-27 DIAGNOSIS — N83.201 CYST OF RIGHT OVARY: Primary | ICD-10-CM

## 2018-03-27 LAB
ANION GAP SERPL CALC-SCNC: 10 MMOL/L (ref 0–18)
B-HCG UR QL: NEGATIVE
BASOPHILS # BLD: 0.1 K/UL (ref 0–0.2)
BASOPHILS NFR BLD: 0 %
BILIRUB UR QL: NEGATIVE
BUN SERPL-MCNC: 7 MG/DL (ref 8–20)
BUN/CREAT SERPL: 12.7 (ref 10–20)
CALCIUM SERPL-MCNC: 9.2 MG/DL (ref 8.5–10.5)
CHLORIDE SERPL-SCNC: 100 MMOL/L (ref 95–110)
CO2 SERPL-SCNC: 26 MMOL/L (ref 22–32)
COLOR UR: YELLOW
CREAT SERPL-MCNC: 0.55 MG/DL (ref 0.5–1.5)
EOSINOPHIL # BLD: 0.1 K/UL (ref 0–0.7)
EOSINOPHIL NFR BLD: 1 %
ERYTHROCYTE [DISTWIDTH] IN BLOOD BY AUTOMATED COUNT: 13 % (ref 11–15)
GLUCOSE SERPL-MCNC: 82 MG/DL (ref 70–99)
GLUCOSE UR-MCNC: NEGATIVE MG/DL
HCT VFR BLD AUTO: 40.7 % (ref 35–48)
HGB BLD-MCNC: 13.7 G/DL (ref 12–16)
HGB UR QL STRIP.AUTO: NEGATIVE
KETONES UR-MCNC: NEGATIVE MG/DL
LEUKOCYTE ESTERASE UR QL STRIP.AUTO: NEGATIVE
LYMPHOCYTES # BLD: 2.9 K/UL (ref 1–4)
LYMPHOCYTES NFR BLD: 23 %
MCH RBC QN AUTO: 29.3 PG (ref 27–32)
MCHC RBC AUTO-ENTMCNC: 33.7 G/DL (ref 32–37)
MCV RBC AUTO: 87.1 FL (ref 80–100)
MONOCYTES # BLD: 0.6 K/UL (ref 0–1)
MONOCYTES NFR BLD: 5 %
NEUTROPHILS # BLD AUTO: 8.9 K/UL (ref 1.8–7.7)
NEUTROPHILS NFR BLD: 71 %
NITRITE UR QL STRIP.AUTO: NEGATIVE
OSMOLALITY UR CALC.SUM OF ELEC: 279 MOSM/KG (ref 275–295)
PH UR: 7 [PH] (ref 5–8)
PLATELET # BLD AUTO: 321 K/UL (ref 140–400)
PMV BLD AUTO: 7.8 FL (ref 7.4–10.3)
POTASSIUM SERPL-SCNC: 3.4 MMOL/L (ref 3.3–5.1)
PROT UR-MCNC: NEGATIVE MG/DL
RBC # BLD AUTO: 4.67 M/UL (ref 3.7–5.4)
RBC #/AREA URNS AUTO: 1 /HPF
SODIUM SERPL-SCNC: 136 MMOL/L (ref 136–144)
SP GR UR STRIP: 1.02 (ref 1–1.03)
UROBILINOGEN UR STRIP-ACNC: <2
VIT C UR-MCNC: NEGATIVE MG/DL
WBC # BLD AUTO: 12.6 K/UL (ref 4–11)
WBC #/AREA URNS AUTO: 2 /HPF

## 2018-03-27 PROCEDURE — 81001 URINALYSIS AUTO W/SCOPE: CPT | Performed by: EMERGENCY MEDICINE

## 2018-03-27 PROCEDURE — 85025 COMPLETE CBC W/AUTO DIFF WBC: CPT | Performed by: EMERGENCY MEDICINE

## 2018-03-27 PROCEDURE — 93975 VASCULAR STUDY: CPT | Performed by: EMERGENCY MEDICINE

## 2018-03-27 PROCEDURE — 87808 TRICHOMONAS ASSAY W/OPTIC: CPT | Performed by: EMERGENCY MEDICINE

## 2018-03-27 PROCEDURE — 36415 COLL VENOUS BLD VENIPUNCTURE: CPT

## 2018-03-27 PROCEDURE — 87106 FUNGI IDENTIFICATION YEAST: CPT | Performed by: EMERGENCY MEDICINE

## 2018-03-27 PROCEDURE — 87591 N.GONORRHOEAE DNA AMP PROB: CPT | Performed by: EMERGENCY MEDICINE

## 2018-03-27 PROCEDURE — 87491 CHLMYD TRACH DNA AMP PROBE: CPT | Performed by: EMERGENCY MEDICINE

## 2018-03-27 PROCEDURE — 81025 URINE PREGNANCY TEST: CPT

## 2018-03-27 PROCEDURE — 76856 US EXAM PELVIC COMPLETE: CPT | Performed by: EMERGENCY MEDICINE

## 2018-03-27 PROCEDURE — 87205 SMEAR GRAM STAIN: CPT | Performed by: EMERGENCY MEDICINE

## 2018-03-27 PROCEDURE — 80048 BASIC METABOLIC PNL TOTAL CA: CPT | Performed by: EMERGENCY MEDICINE

## 2018-03-27 PROCEDURE — 76830 TRANSVAGINAL US NON-OB: CPT | Performed by: EMERGENCY MEDICINE

## 2018-03-27 PROCEDURE — 99285 EMERGENCY DEPT VISIT HI MDM: CPT

## 2018-03-27 NOTE — TELEPHONE ENCOUNTER
Received a fax from patient thru answering service. She did receive our voicemail about going to ER. She will probably go there today.

## 2018-03-27 NOTE — TELEPHONE ENCOUNTER
Spoke with patient and advised that she go to ER. Patient verbalized an understanding and will try to go later today (she is feeling better now). Explained the importance of being examined for appendicitis and patient verbalized an understanding to this.

## 2018-03-28 VITALS
DIASTOLIC BLOOD PRESSURE: 72 MMHG | HEIGHT: 64 IN | HEART RATE: 81 BPM | WEIGHT: 155 LBS | SYSTOLIC BLOOD PRESSURE: 117 MMHG | TEMPERATURE: 98 F | BODY MASS INDEX: 26.46 KG/M2 | RESPIRATION RATE: 18 BRPM | OXYGEN SATURATION: 99 %

## 2018-03-28 LAB
C TRACH DNA SPEC QL NAA+PROBE: NEGATIVE
N GONORRHOEA DNA SPEC QL NAA+PROBE: NEGATIVE

## 2018-03-28 NOTE — ED PROVIDER NOTES
Patient Seen in: [facility] AND [facility] Emergency Department    History   Patient presents with:  Abdomen/Flank Pain (GI/)    Stated Complaint: abd pain    HPI    25-year-old female without significant past medical history presents with complaints of right rupture        Smoking status: Never Smoker                                                              Smokeless tobacco: Never Used                      Comment: No household smokers.    Alcohol use: Yes           0.0 oz/week     Comment: SOCIALLY Result Value    BUN 7 (*)     All other components within normal limits   URINALYSIS WITH CULTURE REFLEX - Abnormal; Notable for the following:     Clarity Urine Cloudy (*)     Bacteria Urine Few (*)     All other components within normal limits   CBC W/ D

## 2018-03-28 NOTE — ED INITIAL ASSESSMENT (HPI)
Complaining of right lower quadrant abdominal pain beginning Saturday. Denies nausea/vomiting/diarrhea.

## 2018-03-28 NOTE — ED NOTES
Pt report RLQ abd pain since Saturday. On physical exam pt has increased tenderness to LLQ. Denies N/V/D and urinary symptoms. Pt reports vaginal discharge but states she typically has chronic yeast infections.

## 2018-03-28 NOTE — TELEPHONE ENCOUNTER
As FYI to DR. QUIROGA  - patient was in ER - has right ovarian cyst and was advised to F/U with her GYN

## 2018-03-30 LAB
GENITAL VAGINOSIS SCREEN: NEGATIVE
TRICHOMONAS SCREEN: NEGATIVE

## 2018-04-30 ENCOUNTER — APPOINTMENT (OUTPATIENT)
Dept: LAB | Facility: HOSPITAL | Age: 23
End: 2018-04-30
Attending: CLINICAL NURSE SPECIALIST
Payer: COMMERCIAL

## 2018-04-30 ENCOUNTER — OFFICE VISIT (OUTPATIENT)
Dept: OBGYN CLINIC | Facility: CLINIC | Age: 23
End: 2018-04-30

## 2018-04-30 VITALS
HEART RATE: 78 BPM | BODY MASS INDEX: 27 KG/M2 | SYSTOLIC BLOOD PRESSURE: 115 MMHG | WEIGHT: 159 LBS | DIASTOLIC BLOOD PRESSURE: 79 MMHG

## 2018-04-30 DIAGNOSIS — Z11.3 SCREENING FOR STD (SEXUALLY TRANSMITTED DISEASE): ICD-10-CM

## 2018-04-30 DIAGNOSIS — N76.0 RECURRENT VAGINITIS: ICD-10-CM

## 2018-04-30 DIAGNOSIS — N83.201 RIGHT OVARIAN CYST: ICD-10-CM

## 2018-04-30 DIAGNOSIS — Z11.3 SCREENING FOR STD (SEXUALLY TRANSMITTED DISEASE): Primary | ICD-10-CM

## 2018-04-30 PROCEDURE — 87340 HEPATITIS B SURFACE AG IA: CPT

## 2018-04-30 PROCEDURE — 36415 COLL VENOUS BLD VENIPUNCTURE: CPT

## 2018-04-30 PROCEDURE — 87389 HIV-1 AG W/HIV-1&-2 AB AG IA: CPT

## 2018-04-30 PROCEDURE — 86780 TREPONEMA PALLIDUM: CPT

## 2018-04-30 PROCEDURE — 99395 PREV VISIT EST AGE 18-39: CPT | Performed by: CLINICAL NURSE SPECIALIST

## 2018-04-30 PROCEDURE — 83036 HEMOGLOBIN GLYCOSYLATED A1C: CPT

## 2018-04-30 PROCEDURE — 86803 HEPATITIS C AB TEST: CPT

## 2018-04-30 NOTE — PROGRESS NOTES
Meng Patiño is a 24year old female  No LMP recorded. Patient is not currently having periods (Reason: IUD - Intrauterine Device).  Patient presents with:  Gyn Exam: Pt in for her annual exam. Pt also c/o a recurrent yeast infection, some ye occas tension ha and rare migraines more in past   • Insomnia     relieved with trazodone and neurontin   • Internal hemorrhoids 2/21/2018   • Labral tear of hip, degenerative     s/p repair; Dec 2016 right hip; Ortho Dr Hanna Herman   • Nevus 2016    Mid chest 53   • Cancer Paternal Uncle      TESTICULAR CANCER       MEDICATIONS:    Current Outpatient Prescriptions:   •  VENLAFAXINE HCL  MG Oral Tablet 24 Hr, TAKE 1 TABLET (225 MG TOTAL) BY MOUTH DAILY. , Disp: 30 tablet, Rfl: 11  •  glycopyrrolate 1 MG Ora lumps, or discharge. Neurological:  denies headaches, extremity weakness or numbness. Psychiatric: denies depression or anxiety. Endocrine:   denies excessive thirst or urination. Heme/Lymph:  denies history of anemia, easy bruising or bleeding. AG AB COMBO; Future  -     CHLAMYDIA/GONOCOCCUS, FREDY; Future  -     GENITAL VAGINOSIS SCREEN; Future  -     CHLAMYDIA/GONOCOCCUS, FREDY  -     GENITAL VAGINOSIS SCREEN    Right ovarian cyst  -     US PELVIS (TRANSVAGINAL PELVIS) (CPT=76830);  Future

## 2018-04-30 NOTE — PATIENT INSTRUCTIONS
Guidelines for Vulvar Skin Care    NOTE: The goal is to promote healthy vulvar skin. This is done by decreasing and removing chemicals, moisture, or rubbing (friction).  Products listed below have been suggested for use because of their past success in help \"mild\". Dove for Sensitive Skin, Neutrogena, Basis,    Aveeno, or Pears are the soaps we suggest. Do not use soap directly on the vulvar skin. Just warm water and your hand will keep the vulvar area clean without irritating the skin.   2. Do These products also help to decrease skin irritation during your period and when you urinate. If wearing wool causes you to itch, do not use A&D ointment, as it contains lanolin. 11. DO NOT DOUCHE.  Baking soda soaks or rinsing with warm water will help ri may affect the integrity of condoms when used for birth control or prevention of sexually transmitted diseases. Our experience has not found this to be a problem with vegetable-based oils.  However, the Centers for Disease Control recommends that condoms no

## 2018-05-07 ENCOUNTER — PATIENT MESSAGE (OUTPATIENT)
Dept: OBGYN CLINIC | Facility: CLINIC | Age: 23
End: 2018-05-07

## 2018-05-07 RX ORDER — FLUCONAZOLE 200 MG/1
200 TABLET ORAL ONCE
Qty: 3 TABLET | Refills: 0 | Status: SHIPPED | OUTPATIENT
Start: 2018-05-07 | End: 2018-05-07

## 2018-05-07 NOTE — TELEPHONE ENCOUNTER
I sent this as a results note, but here it is again if you didn't see it. Per pt, its ok to leave a detailed voicemail. ... Please let pt know pap shows ASCUS but negative HPV so we treat this like a normal pap.  The cells could look different since she d

## 2018-05-07 NOTE — TELEPHONE ENCOUNTER
From: Joaquin Florez  To: LING Minaya  Sent: 5/7/2018 11:50 AM CDT  Subject: Prescription Question    Jamie Avelar,    I couldn't remember if we had decided to try another round of the Diflucan or if we were going to try the extended course of

## 2018-05-07 NOTE — TELEPHONE ENCOUNTER
Message to [facility] to please review pts my chart message. Pt saw you on 4/30/18 for annual and had vaginal culture done. Vaginal culture showed 2+ candida albicans.  Your note from visit also stated \"Discussed use of Diflucan weekly to see if it helps vs Rx Te

## 2018-05-14 ENCOUNTER — HOSPITAL ENCOUNTER (OUTPATIENT)
Dept: ULTRASOUND IMAGING | Age: 23
Discharge: HOME | End: 2018-05-14
Attending: CLINICAL NURSE SPECIALIST
Payer: COMMERCIAL

## 2018-05-14 DIAGNOSIS — N83.201 RIGHT OVARIAN CYST: ICD-10-CM

## 2018-05-14 PROCEDURE — 93976 VASCULAR STUDY: CPT | Performed by: CLINICAL NURSE SPECIALIST

## 2018-05-14 PROCEDURE — 76856 US EXAM PELVIC COMPLETE: CPT | Performed by: CLINICAL NURSE SPECIALIST

## 2018-05-14 PROCEDURE — 76830 TRANSVAGINAL US NON-OB: CPT | Performed by: CLINICAL NURSE SPECIALIST

## 2018-05-15 ENCOUNTER — TELEPHONE (OUTPATIENT)
Dept: OBGYN CLINIC | Facility: CLINIC | Age: 23
End: 2018-05-15

## 2018-05-15 NOTE — TELEPHONE ENCOUNTER
----- Message from LING Goodson sent at 5/15/2018  4:33 PM CDT -----  Please let pt know large cyst has resolved so nothing needs to be done at this time. Let me know if she has any questions.      MAF

## 2018-05-25 RX ORDER — GABAPENTIN 300 MG/1
CAPSULE ORAL
Qty: 90 CAPSULE | Refills: 3 | OUTPATIENT
Start: 2018-05-25

## 2018-06-06 RX ORDER — GABAPENTIN 300 MG/1
CAPSULE ORAL
Qty: 90 CAPSULE | Refills: 3 | OUTPATIENT
Start: 2018-06-06

## 2018-06-06 NOTE — TELEPHONE ENCOUNTER
Refill request has failed the Ambulatory Medication Refill Standing Order. Last refilled on 8/18/17 by MD for a year.

## 2018-06-18 ENCOUNTER — OFFICE VISIT (OUTPATIENT)
Dept: OBGYN CLINIC | Facility: CLINIC | Age: 23
End: 2018-06-18

## 2018-06-18 VITALS
BODY MASS INDEX: 28 KG/M2 | WEIGHT: 162.19 LBS | SYSTOLIC BLOOD PRESSURE: 119 MMHG | DIASTOLIC BLOOD PRESSURE: 79 MMHG | HEART RATE: 82 BPM

## 2018-06-18 DIAGNOSIS — Z30.432 ENCOUNTER FOR REMOVAL OF INTRAUTERINE CONTRACEPTIVE DEVICE: Primary | ICD-10-CM

## 2018-06-18 PROCEDURE — 58301 REMOVE INTRAUTERINE DEVICE: CPT | Performed by: CLINICAL NURSE SPECIALIST

## 2018-06-18 RX ORDER — ALPRAZOLAM 0.25 MG/1
0.25 TABLET ORAL
COMMUNITY
Start: 2011-02-09 | End: 2018-07-23

## 2018-06-20 ENCOUNTER — TELEPHONE (OUTPATIENT)
Dept: INTERNAL MEDICINE CLINIC | Facility: CLINIC | Age: 23
End: 2018-06-20

## 2018-06-20 RX ORDER — TRAZODONE HYDROCHLORIDE 50 MG/1
TABLET ORAL
Qty: 90 TABLET | Refills: 0 | Status: SHIPPED | OUTPATIENT
Start: 2018-06-20 | End: 2018-07-23

## 2018-06-20 NOTE — TELEPHONE ENCOUNTER
Refilled trazodone 50 mg po qHS, #90, no RF; she is due for annual physical exam by Aug 2018; please notify pt

## 2018-06-20 NOTE — PROCEDURES
IUD Removal   Had ovarian cyst since IUD insertion and feels like she is getting them every month. Not happy with IUD and wants it removed. Pregnancy Results: N/a  Birth control method(s) used: Mirena (expires 1/15/2023)   Consent signed.   Procedure disc

## 2018-07-05 ENCOUNTER — TELEPHONE (OUTPATIENT)
Dept: INTERNAL MEDICINE CLINIC | Facility: CLINIC | Age: 23
End: 2018-07-05

## 2018-07-05 DIAGNOSIS — Z00.00 ANNUAL PHYSICAL EXAM: Primary | ICD-10-CM

## 2018-07-06 RX ORDER — LORAZEPAM 0.5 MG/1
TABLET ORAL
Qty: 60 TABLET | Refills: 1 | Status: SHIPPED
Start: 2018-07-06 | End: 2019-03-19

## 2018-07-06 NOTE — TELEPHONE ENCOUNTER
Refilled lorazepam 0.5 mg po BID prn, #60, 1RF; Rx FAXed to [facility] in [place]; she is due for annual physical exam in Aug 2018; please call pt and request she schedule appt; to nursing

## 2018-07-09 NOTE — TELEPHONE ENCOUNTER
To  - LMTCB to schedule appt for annual PE. Please follow up to schedule August PE appt. Thank you!!  May need fasting labs - back to nursing after appt scheduled.

## 2018-07-15 ENCOUNTER — LAB ENCOUNTER (OUTPATIENT)
Dept: LAB | Facility: HOSPITAL | Age: 23
End: 2018-07-15
Attending: INTERNAL MEDICINE
Payer: COMMERCIAL

## 2018-07-15 DIAGNOSIS — Z00.00 ANNUAL PHYSICAL EXAM: ICD-10-CM

## 2018-07-15 LAB
ALBUMIN SERPL BCP-MCNC: 4.1 G/DL (ref 3.5–4.8)
ALBUMIN/GLOB SERPL: 1.4 {RATIO} (ref 1–2)
ALP SERPL-CCNC: 57 U/L (ref 32–100)
ALT SERPL-CCNC: 21 U/L (ref 14–54)
ANION GAP SERPL CALC-SCNC: 5 MMOL/L (ref 0–18)
AST SERPL-CCNC: 19 U/L (ref 15–41)
BASOPHILS # BLD: 0 K/UL (ref 0–0.2)
BASOPHILS NFR BLD: 1 %
BILIRUB SERPL-MCNC: 0.5 MG/DL (ref 0.3–1.2)
BUN SERPL-MCNC: 8 MG/DL (ref 8–20)
BUN/CREAT SERPL: 14.5 (ref 10–20)
CALCIUM SERPL-MCNC: 9.2 MG/DL (ref 8.5–10.5)
CHLORIDE SERPL-SCNC: 104 MMOL/L (ref 95–110)
CHOLEST SERPL-MCNC: 194 MG/DL (ref 110–200)
CO2 SERPL-SCNC: 28 MMOL/L (ref 22–32)
CREAT SERPL-MCNC: 0.55 MG/DL (ref 0.5–1.5)
EOSINOPHIL # BLD: 0 K/UL (ref 0–0.7)
EOSINOPHIL NFR BLD: 1 %
ERYTHROCYTE [DISTWIDTH] IN BLOOD BY AUTOMATED COUNT: 13.1 % (ref 11–15)
GLOBULIN PLAS-MCNC: 3 G/DL (ref 2.5–3.7)
GLUCOSE SERPL-MCNC: 87 MG/DL (ref 70–99)
HCT VFR BLD AUTO: 41.2 % (ref 35–48)
HDLC SERPL-MCNC: 60 MG/DL
HGB BLD-MCNC: 13.8 G/DL (ref 12–16)
LDLC SERPL CALC-MCNC: 124 MG/DL (ref 0–99)
LYMPHOCYTES # BLD: 1.5 K/UL (ref 1–4)
LYMPHOCYTES NFR BLD: 22 %
MCH RBC QN AUTO: 29.3 PG (ref 27–32)
MCHC RBC AUTO-ENTMCNC: 33.5 G/DL (ref 32–37)
MCV RBC AUTO: 87.3 FL (ref 80–100)
MONOCYTES # BLD: 0.4 K/UL (ref 0–1)
MONOCYTES NFR BLD: 6 %
NEUTROPHILS # BLD AUTO: 4.8 K/UL (ref 1.8–7.7)
NEUTROPHILS NFR BLD: 72 %
NONHDLC SERPL-MCNC: 134 MG/DL
OSMOLALITY UR CALC.SUM OF ELEC: 282 MOSM/KG (ref 275–295)
PATIENT FASTING: YES
PLATELET # BLD AUTO: 333 K/UL (ref 140–400)
PMV BLD AUTO: 8.2 FL (ref 7.4–10.3)
POTASSIUM SERPL-SCNC: 3.7 MMOL/L (ref 3.3–5.1)
PROT SERPL-MCNC: 7.1 G/DL (ref 5.9–8.4)
RBC # BLD AUTO: 4.71 M/UL (ref 3.7–5.4)
SODIUM SERPL-SCNC: 137 MMOL/L (ref 136–144)
TRIGL SERPL-MCNC: 49 MG/DL (ref 1–149)
TSH SERPL-ACNC: 2.11 UIU/ML (ref 0.45–5.33)
WBC # BLD AUTO: 6.7 K/UL (ref 4–11)

## 2018-07-15 PROCEDURE — 36415 COLL VENOUS BLD VENIPUNCTURE: CPT

## 2018-07-15 PROCEDURE — 84443 ASSAY THYROID STIM HORMONE: CPT

## 2018-07-15 PROCEDURE — 80053 COMPREHEN METABOLIC PANEL: CPT

## 2018-07-15 PROCEDURE — 85025 COMPLETE CBC W/AUTO DIFF WBC: CPT

## 2018-07-15 PROCEDURE — 80061 LIPID PANEL: CPT

## 2018-07-23 ENCOUNTER — OFFICE VISIT (OUTPATIENT)
Dept: INTERNAL MEDICINE CLINIC | Facility: CLINIC | Age: 23
End: 2018-07-23
Payer: COMMERCIAL

## 2018-07-23 ENCOUNTER — PATIENT MESSAGE (OUTPATIENT)
Dept: OBGYN CLINIC | Facility: CLINIC | Age: 23
End: 2018-07-23

## 2018-07-23 VITALS
DIASTOLIC BLOOD PRESSURE: 76 MMHG | BODY MASS INDEX: 27.66 KG/M2 | WEIGHT: 162 LBS | HEIGHT: 64 IN | HEART RATE: 80 BPM | TEMPERATURE: 98 F | SYSTOLIC BLOOD PRESSURE: 110 MMHG

## 2018-07-23 DIAGNOSIS — N83.201 BILATERAL OVARIAN CYSTS: ICD-10-CM

## 2018-07-23 DIAGNOSIS — G47.00 INSOMNIA, UNSPECIFIED TYPE: ICD-10-CM

## 2018-07-23 DIAGNOSIS — F41.9 ANXIETY: ICD-10-CM

## 2018-07-23 DIAGNOSIS — Z00.00 ANNUAL PHYSICAL EXAM: Primary | ICD-10-CM

## 2018-07-23 DIAGNOSIS — R61 DIAPHORESIS: ICD-10-CM

## 2018-07-23 DIAGNOSIS — K58.9 IRRITABLE BOWEL SYNDROME WITHOUT DIARRHEA: ICD-10-CM

## 2018-07-23 DIAGNOSIS — R51.9 HEADACHE, UNSPECIFIED HEADACHE TYPE: ICD-10-CM

## 2018-07-23 DIAGNOSIS — N83.202 BILATERAL OVARIAN CYSTS: ICD-10-CM

## 2018-07-23 DIAGNOSIS — M79.7 FIBROMYALGIA: ICD-10-CM

## 2018-07-23 PROCEDURE — 99395 PREV VISIT EST AGE 18-39: CPT | Performed by: INTERNAL MEDICINE

## 2018-07-23 RX ORDER — TRAZODONE HYDROCHLORIDE 50 MG/1
TABLET ORAL
Qty: 30 TABLET | Refills: 11 | Status: SHIPPED | OUTPATIENT
Start: 2018-07-23 | End: 2019-08-16

## 2018-07-23 RX ORDER — GABAPENTIN 300 MG/1
CAPSULE ORAL
Qty: 60 CAPSULE | Refills: 11 | Status: SHIPPED | OUTPATIENT
Start: 2018-07-23 | End: 2019-08-15

## 2018-07-23 RX ORDER — VENLAFAXINE HYDROCHLORIDE 150 MG/1
150 TABLET, EXTENDED RELEASE ORAL DAILY
Qty: 30 TABLET | Refills: 11 | Status: SHIPPED | OUTPATIENT
Start: 2018-07-23 | End: 2019-01-21

## 2018-07-23 NOTE — PROGRESS NOTES
Sharon Hickey is a 24year old female.     HPI:   Patient presents with:  Physical: Pt needs medication refills and physical for school.       25 y/o F here for physical exam; has h/o Anxiety for which she has been taking venlafaxine  mg po qD, HISTORY Right      Comment: Right ear drum rupture   Family History   Problem Relation Age of Onset   • Hypertension Mother    • Cancer Father      precanc skin lesions on face   • Actinic Keratosis Father      possible dysplastic nevi   • Breast Cancer Pa drainage  Eyes:  Negative for eye discharge and vision loss  Cardiovascular:  Negative for chest pain; negative palpitations  Respiratory:  Negative for cough, dyspnea and wheezing  Endocrine:  Negative for abnormal sleep patterns, increased activity, poly matriculation;      Ovarian cysts  Pelvis U/S in March 2018 shows Large 6 cm slightly complicated cyst in the right ovary; repeat pelvic U/S in May 2018 showed previous 6 cm cyst in the right ovary has completely resolved, and new 3 cm simple cyst left ovar GOLD (IN TUBE) [47832] [Q]    Meds This Visit:    Signed Prescriptions Disp Refills    TraZODone HCl 50 MG Oral Tab 30 tablet 11      Sig: TAKE 1 TABLET BY MOUTH EVERY EVENING      gabapentin 300 MG Oral Cap 60 capsule 11      Sig: TAKE TWO CAPSULES (=600m

## 2018-07-23 NOTE — TELEPHONE ENCOUNTER
From: Sendy Alaniz  To: LING Paniagua  Sent: 7/23/2018 9:13 AM CDT  Subject: Visit Follow-up Question    Jamie Avelar,    I think I want to go back to the ROMA/ Cammy 9. Is it ok to make an appointment for this?      Thank you,  Ish Gipson

## 2018-07-23 NOTE — TELEPHONE ENCOUNTER
Message to [facility] to please advise pts my chart message. It appears pt had nexplanon in 2015. OK for pt to call on first day of next period to set up appt for nexplanon insertion?    Last annual: 11/2017

## 2018-07-30 ENCOUNTER — TELEPHONE (OUTPATIENT)
Dept: INTERNAL MEDICINE CLINIC | Facility: CLINIC | Age: 23
End: 2018-07-30

## 2018-07-30 DIAGNOSIS — Z23 NEED FOR VACCINATION: Primary | ICD-10-CM

## 2018-07-31 ENCOUNTER — NURSE ONLY (OUTPATIENT)
Dept: INTERNAL MEDICINE CLINIC | Facility: CLINIC | Age: 23
End: 2018-07-31
Payer: COMMERCIAL

## 2018-07-31 ENCOUNTER — TELEPHONE (OUTPATIENT)
Dept: INTERNAL MEDICINE CLINIC | Facility: CLINIC | Age: 23
End: 2018-07-31

## 2018-07-31 DIAGNOSIS — Z23 IMMUNIZATION DUE: ICD-10-CM

## 2018-07-31 LAB
HEPATITIS B SURFACE$ANTIBODY (QUANT): 31 MIU/ML
MEASLES ANTIBODY (IGG): >300 AU/ML
MUMPS VIRUS$ANTIBODY (IGG): 165 AU/ML
RUBELLA ANTIBODY (IGG): 5.39 INDEX
VARICELLA ZOSTER VIRUS$AB (IGG): >4000 INDEX

## 2018-07-31 PROCEDURE — 90471 IMMUNIZATION ADMIN: CPT | Performed by: INTERNAL MEDICINE

## 2018-07-31 PROCEDURE — 90715 TDAP VACCINE 7 YRS/> IM: CPT | Performed by: INTERNAL MEDICINE

## 2018-07-31 NOTE — TELEPHONE ENCOUNTER
Patient would like recent lab results. Please release to [employer]. Patient aware MD is out of office this week. This is not urgent.

## 2018-08-01 ENCOUNTER — TELEPHONE (OUTPATIENT)
Dept: INTERNAL MEDICINE CLINIC | Facility: CLINIC | Age: 23
End: 2018-08-01

## 2018-08-01 LAB
MITOGEN-NIL: 7.52 IU/ML
NIL: 0.04 IU/ML
QUANTIFERON(R)-TB GOLD, (INCUBATED): NEGATIVE
TB-NIL: <0 IU/ML

## 2018-08-01 NOTE — TELEPHONE ENCOUNTER
Labs 7/30 reviewed; all titers show immunity to measles, mumps, rubella, varicella zoster, and Hepatitis B; also, quantiferon gold tuberculosis test was negative; please notify pt; she will need written copies for her school's health requirement; please as

## 2018-08-03 NOTE — TELEPHONE ENCOUNTER
Patient called back - relayed DR. QUIROGA message - verbalized understanding > she will  record at  [phone]

## 2018-08-06 ENCOUNTER — OFFICE VISIT (OUTPATIENT)
Dept: OCCUPATIONAL MEDICINE | Age: 23
End: 2018-08-06
Attending: PHYSICIAN ASSISTANT

## 2018-08-21 ENCOUNTER — OFFICE VISIT (OUTPATIENT)
Dept: INTERNAL MEDICINE CLINIC | Facility: CLINIC | Age: 23
End: 2018-08-21
Payer: COMMERCIAL

## 2018-08-21 VITALS
SYSTOLIC BLOOD PRESSURE: 106 MMHG | TEMPERATURE: 99 F | WEIGHT: 163 LBS | HEART RATE: 76 BPM | HEIGHT: 64 IN | BODY MASS INDEX: 27.83 KG/M2 | DIASTOLIC BLOOD PRESSURE: 68 MMHG

## 2018-08-21 DIAGNOSIS — H60.501 ACUTE OTITIS EXTERNA OF RIGHT EAR, UNSPECIFIED TYPE: Primary | ICD-10-CM

## 2018-08-21 PROCEDURE — 99212 OFFICE O/P EST SF 10 MIN: CPT | Performed by: INTERNAL MEDICINE

## 2018-08-21 PROCEDURE — 99213 OFFICE O/P EST LOW 20 MIN: CPT | Performed by: INTERNAL MEDICINE

## 2018-08-21 RX ORDER — CIPROFLOXACIN AND DEXAMETHASONE 3; 1 MG/ML; MG/ML
4 SUSPENSION/ DROPS AURICULAR (OTIC) 2 TIMES DAILY
Qty: 1 BOTTLE | Refills: 0 | Status: SHIPPED | OUTPATIENT
Start: 2018-08-21 | End: 2018-08-26

## 2018-08-21 NOTE — PROGRESS NOTES
Jackelin Myers is a 24year old female who presents for     Pain in R ear canal started 4 days ago. felt some crustiness on inner ear canal and used a Q tip and there was red crusty material on Q tip. No fever. Hearing is ok.    Feels she has an e Other    • Skin cancer Other    • Dementia Other      ALZHEIMERS DISEASE   • Diabetes Other    • Breast Cancer Paternal Aunt 54   • Cancer Paternal Uncle      TESTICULAR CANCER      Social History:   Smoking status: Never Smoker Tablet 24 Hr Take 1 tablet (150 mg total) by mouth daily. Disp: 30 tablet Rfl: 11   LORAZEPAM 0.5 MG Oral Tab TAKE 1 TABLET BY MOUTH TWICE A DAY AS NEEDED FOR ANXIETY Disp: 60 tablet Rfl: 1   glycopyrrolate 1 MG Oral Tab TAKE 3 TABLETS BY MOUTH DAILY.  Disp

## 2018-08-23 ENCOUNTER — TELEPHONE (OUTPATIENT)
Dept: OBGYN CLINIC | Facility: CLINIC | Age: 23
End: 2018-08-23

## 2018-08-23 RX ORDER — NORETHINDRONE ACETATE AND ETHINYL ESTRADIOL AND FERROUS FUMARATE 1MG-20(24)
1 KIT ORAL DAILY
Qty: 84 TABLET | Refills: 0 | Status: SHIPPED | OUTPATIENT
Start: 2018-08-23 | End: 2018-11-14

## 2018-08-23 NOTE — TELEPHONE ENCOUNTER
See Mychart encounter dated 7/23/18. Pt would like to restart the pill she was on previously for a few months before getting nexplanon. MAF said this was ok. Need to fond out what medication pt was on. Pt states she was on Minstrin 24 FE.   Pt is asking

## 2018-08-23 NOTE — TELEPHONE ENCOUNTER
This is fine. Please remind her that the pill takes a month to be effective and she may have some BTB since its been awhile since she has been on the pill.        MAF

## 2018-08-23 NOTE — TELEPHONE ENCOUNTER
LMTCB  Want to double check what pill pt was on. Last pill in chart was minastrin 24 FE in 2015.   Aslo, does pt only want pills for a few months or until her next annual?

## 2018-08-24 ENCOUNTER — PATIENT MESSAGE (OUTPATIENT)
Dept: OBGYN CLINIC | Facility: CLINIC | Age: 23
End: 2018-08-24

## 2018-08-24 NOTE — TELEPHONE ENCOUNTER
From: Carlos Lunsford  To: LING Ansari  Sent: 8/24/2018 12:05 PM CDT  Subject: Prescription Question    Hi,  When should I start my birth control? I am on my period now.

## 2018-10-12 ENCOUNTER — TELEPHONE (OUTPATIENT)
Dept: FAMILY MEDICINE CLINIC | Facility: CLINIC | Age: 23
End: 2018-10-12

## 2018-10-12 NOTE — TELEPHONE ENCOUNTER
Current Outpatient Medications:                            glycopyrrolate 1 MG Oral Tab TAKE 3 TABLETS BY MOUTH DAILY.  Disp: 90 tablet Rfl: 11

## 2018-10-13 RX ORDER — GLYCOPYRROLATE 1 MG/1
TABLET ORAL
Qty: 90 TABLET | Refills: 1 | Status: SHIPPED | OUTPATIENT
Start: 2018-10-13 | End: 2019-01-31

## 2018-10-13 NOTE — TELEPHONE ENCOUNTER
LM on voicemail informing rx was sent to [facility] in [place] and for pt to call back if she has any questions or would like rx to be sent to a diff pharmacy.

## 2018-10-15 ENCOUNTER — TELEPHONE (OUTPATIENT)
Dept: FAMILY MEDICINE CLINIC | Facility: CLINIC | Age: 23
End: 2018-10-15

## 2018-10-15 NOTE — TELEPHONE ENCOUNTER
Current Outpatient Medications:  glycopyrrolate 1 MG Oral Tab TAKE 3 TABLETS BY MOUTH DAILY.  Disp: 90 tablet Rfl: 1

## 2018-10-15 NOTE — TELEPHONE ENCOUNTER
Pt seen 9/19/17, looks like this was just ok'ed for 1 month supply plus 1 additional refill - pharmacy asking to dispense 90 days supply for INS purposes - please advise

## 2018-10-18 ENCOUNTER — TELEPHONE (OUTPATIENT)
Dept: OBGYN CLINIC | Facility: CLINIC | Age: 23
End: 2018-10-18

## 2018-10-18 NOTE — TELEPHONE ENCOUNTER
This is fine with me but ROSEMARYK has to be ok with it. If she is not she will have to reschedule.       MAF

## 2018-10-18 NOTE — TELEPHONE ENCOUNTER
MARINO pt, states today is day 1 of her period and she would like the nexplanon inserted. Offered her appts on Monday and Wednesday of next week. Pt unable to make those appts. Pt requesting to be seen Tuesday after 4 pm. MARINO is not here Tuesday.  Pt asking if

## 2018-10-18 NOTE — TELEPHONE ENCOUNTER
Pt saw [facility] in 6/2018 for IUD removal and no mention of Nexplanon. Pt sent to [facility] message 7/23/18 \"After thinking about it further, I have decided to wait a few more months for the Nexplanon. \" Routed to [facility] Ok to schedule Nexplanon insertion or have p

## 2018-10-18 NOTE — TELEPHONE ENCOUNTER
Patient will like to know if ok to be seen by another MD to insert, also patient stating she will not be able to come in tomorrow until after 4pm, please advice.

## 2018-10-19 NOTE — TELEPHONE ENCOUNTER
Informed pt of ROSEMARYK recs below and appt on 10/23/18 was canceled. Advised pt to call day 1 of next cycle. Pt verbalized understanding.

## 2018-11-14 RX ORDER — NORETHINDRONE ACETATE AND ETHINYL ESTRADIOL AND FERROUS FUMARATE 1MG-20(24)
KIT ORAL
Qty: 84 TABLET | Refills: 0 | OUTPATIENT
Start: 2018-11-14

## 2018-11-14 NOTE — TELEPHONE ENCOUNTER
LAST ANNUAL 11-30-17, LAST PAP 4-30-18. ON 8-23-18 PT WAS GIVEN #84. NO FUTURE APPT MADE. SENT BACK RX DENIED, NEEDS APPT.

## 2018-11-15 RX ORDER — NORETHINDRONE ACETATE AND ETHINYL ESTRADIOL AND FERROUS FUMARATE 1MG-20(24)
KIT ORAL
Qty: 84 TABLET | Refills: 0 | Status: SHIPPED | OUTPATIENT
Start: 2018-11-15 | End: 2019-01-17 | Stop reason: ALTCHOICE

## 2018-11-15 NOTE — TELEPHONE ENCOUNTER
Called pt to verify if still needs OCPs refill as per several previous encounters wants Nexplanon. States just changed insurances and wants to wait until next year d/t new deductible.  Informed will review msg from [facility] from 7/2018 and if ok will send erx ot

## 2019-01-02 ENCOUNTER — OFFICE VISIT (OUTPATIENT)
Dept: OBGYN CLINIC | Facility: CLINIC | Age: 24
End: 2019-01-02
Payer: COMMERCIAL

## 2019-01-02 VITALS
HEART RATE: 77 BPM | WEIGHT: 172 LBS | DIASTOLIC BLOOD PRESSURE: 89 MMHG | SYSTOLIC BLOOD PRESSURE: 128 MMHG | BODY MASS INDEX: 30 KG/M2

## 2019-01-02 DIAGNOSIS — N89.8 VAGINAL ITCHING: ICD-10-CM

## 2019-01-02 DIAGNOSIS — Z30.09 ENCOUNTER FOR COUNSELING REGARDING CONTRACEPTION: ICD-10-CM

## 2019-01-02 DIAGNOSIS — Z01.419 WELL WOMAN EXAM WITH ROUTINE GYNECOLOGICAL EXAM: Primary | ICD-10-CM

## 2019-01-02 PROCEDURE — 99395 PREV VISIT EST AGE 18-39: CPT | Performed by: CLINICAL NURSE SPECIALIST

## 2019-01-03 NOTE — PROGRESS NOTES
Lucien Adame is a 24year old female [facility] Patient's last menstrual period was 12/10/2018. Patient presents with:  Gyn Exam: Annual.  Annual schedule is off. pts last annual and pap was 4/30/18. Pap was ASCUS but - HPV.  Has new insurance and since she occas tension ha and rare migraines more in past   • Insomnia     relieved with trazodone and neurontin   • Internal hemorrhoids 2/21/2018   • Labral tear of hip, degenerative     s/p repair; Dec 2016 right hip; Ortho Dr Perkins Mention   • Nevus 2016    Mid chest Back Care: Not Asked        Exercise: No        Bike Helmet: Not Asked        Seat Belt: Not Asked        Self-Exams: Not Asked        Grew up on a farm: Not Asked        History of tanning: Not Asked        Outdoor occupation: Not Asked        Pt has Aspirin-Acetaminophen-Caffeine (EXCEDRIN MIGRAINE OR), Take 1 tablet by mouth as needed. , Disp: , Rfl:     ALLERGIES:    Dog Dander [Dander]       Reglan [Metoclopram*          Review of Systems:  Constitutional:  Denies fatigue, night sweats, hot flashes or tenderness  Perineum: normal  Anus: no hemorroids     Assessment & Plan:  Kelsey Morgan was seen today for gyn exam.    Diagnoses and all orders for this visit:    Well woman exam with routine gynecological exam    Vaginal itching  -     GENITAL VAGINOSIS SCREE

## 2019-01-04 LAB
GENITAL VAGINOSIS SCREEN: NEGATIVE
TRICHOMONAS SCREEN: NEGATIVE

## 2019-01-10 ENCOUNTER — TELEPHONE (OUTPATIENT)
Dept: OBGYN CLINIC | Facility: CLINIC | Age: 24
End: 2019-01-10

## 2019-01-10 NOTE — TELEPHONE ENCOUNTER
Pt confirms her period started today and calling to schedule Nexplanon. Informed pt next available appt with MAF is on Wed and Thur but this will be day 7 and 8 of pt's cycle.  Informed pt message will be sent to [facility] to ask if this is okay or does pt have t

## 2019-01-14 NOTE — TELEPHONE ENCOUNTER
Wed or Thursday is fine. Will do a urine pregnancy test in the office. Tell her not to have any unprotected sex.     MAF

## 2019-01-14 NOTE — TELEPHONE ENCOUNTER
Pt informed of MAFs recs below and verbalized understanding.  Pt accepted appt with MAF on 1/17 at 4:40pm. Pt advised no unprotected IC and will get urine sample day of appt

## 2019-01-17 ENCOUNTER — OFFICE VISIT (OUTPATIENT)
Dept: OBGYN CLINIC | Facility: CLINIC | Age: 24
End: 2019-01-17
Payer: COMMERCIAL

## 2019-01-17 VITALS
DIASTOLIC BLOOD PRESSURE: 82 MMHG | SYSTOLIC BLOOD PRESSURE: 125 MMHG | HEART RATE: 72 BPM | BODY MASS INDEX: 29 KG/M2 | WEIGHT: 171.19 LBS

## 2019-01-17 DIAGNOSIS — N89.8 VAGINAL ITCHING: ICD-10-CM

## 2019-01-17 DIAGNOSIS — Z30.017 INSERTION OF IMPLANTABLE SUBDERMAL CONTRACEPTIVE: ICD-10-CM

## 2019-01-17 DIAGNOSIS — Z32.00 PREGNANCY EXAMINATION OR TEST, PREGNANCY UNCONFIRMED: Primary | ICD-10-CM

## 2019-01-17 LAB
CONTROL LINE PRESENT WITH A CLEAR BACKGROUND (YES/NO): YES YES/NO
KIT LOT #: NORMAL NUMERIC
PREGNANCY TEST, URINE: NEGATIVE

## 2019-01-17 PROCEDURE — 11981 INSERTION DRUG DLVR IMPLANT: CPT | Performed by: CLINICAL NURSE SPECIALIST

## 2019-01-17 PROCEDURE — 81025 URINE PREGNANCY TEST: CPT | Performed by: CLINICAL NURSE SPECIALIST

## 2019-01-18 NOTE — PROCEDURES
Nexplanon Insertion    Pregnancy Results: negative from urine test   Birth control method(s) used:  OCP;  Consent was obtained from the patient. Insertion:  Lot Number [id number]  The patient was positioned with her left arm flexed.     Measurement was take

## 2019-01-21 ENCOUNTER — TELEPHONE (OUTPATIENT)
Dept: INTERNAL MEDICINE CLINIC | Facility: CLINIC | Age: 24
End: 2019-01-21

## 2019-01-21 RX ORDER — VENLAFAXINE HYDROCHLORIDE 75 MG/1
75 TABLET, EXTENDED RELEASE ORAL DAILY
Qty: 30 TABLET | Refills: 3 | Status: SHIPPED | OUTPATIENT
Start: 2019-01-21 | End: 2019-08-16

## 2019-01-22 NOTE — TELEPHONE ENCOUNTER
Patient not available; advise decrease venlafaxine ER to 75 mg po qD #30 for now; LMVM; LMTCB    ----------------------  From: Raudel Wan  To: Donavon Dance, MD  Sent: 1/21/2019  2:36 PM CST  Subject: Prescription Question    Hi,    I would like t

## 2019-01-23 ENCOUNTER — OFFICE VISIT (OUTPATIENT)
Dept: INTERNAL MEDICINE CLINIC | Facility: CLINIC | Age: 24
End: 2019-01-23
Payer: COMMERCIAL

## 2019-01-23 VITALS
TEMPERATURE: 99 F | DIASTOLIC BLOOD PRESSURE: 84 MMHG | HEART RATE: 82 BPM | WEIGHT: 170 LBS | SYSTOLIC BLOOD PRESSURE: 122 MMHG | OXYGEN SATURATION: 99 % | BODY MASS INDEX: 29 KG/M2

## 2019-01-23 DIAGNOSIS — H61.22 IMPACTED CERUMEN OF LEFT EAR: Primary | ICD-10-CM

## 2019-01-23 PROCEDURE — 99213 OFFICE O/P EST LOW 20 MIN: CPT | Performed by: INTERNAL MEDICINE

## 2019-01-23 PROCEDURE — 99212 OFFICE O/P EST SF 10 MIN: CPT | Performed by: INTERNAL MEDICINE

## 2019-01-23 NOTE — PROGRESS NOTES
Bonnie Gordon is a 24year old female. Patient presents with:  Ear Problem: patient reports that she used Q-tip and feels like she pushed wax further in, feels \"swollen\" , \"pressure\" and \"blocked\".  Used hydrogen pyroxide and ear wax remover OTC with STOPPED   • Body aches     BACLOFEN TID, NO RELIEF-STOPPED. NOT FIBROMYALGIA.  RESOLVED WITH INCREASED WATER INTAKE   • Cyst of joint of ankle or foot     DRAINED   • Dysmenorrhea     OCP's   • Dysplastic nevus 2014    compound dysplastic nevus, mild, skin pain  LUNGS: denies shortness of breath with exertion, wheezing, cough, or sputum production  CARDIOVASCULAR: denies chest pain, pressure, or palpitations      EXAM:   /84 (BP Location: Right arm, Patient Position: Sitting, Cuff Size: adult)   Pulse

## 2019-01-25 ENCOUNTER — PATIENT MESSAGE (OUTPATIENT)
Dept: INTERNAL MEDICINE CLINIC | Facility: CLINIC | Age: 24
End: 2019-01-25

## 2019-01-25 RX ORDER — FLUOXETINE 10 MG/1
CAPSULE ORAL
Qty: 21 CAPSULE | Refills: 0 | Status: SHIPPED | OUTPATIENT
Start: 2019-01-25 | End: 2019-07-03

## 2019-01-25 NOTE — TELEPHONE ENCOUNTER
From: Ranulfo Jack  To: Samaria Corona MD  Sent: 1/25/2019 9:23 AM CST  Subject: Prescription Question    Hi,    Thank you for filling that prescription so quickly.  I have taken the 75mg for 3 days now and I have been feeling very dizzy and have a b

## 2019-01-25 NOTE — PROGRESS NOTES
Will change venlafaxine ER 75 mg qD to fluoxetine 20 mg po qD x7d, then 10 mg po qD x7d then stop; pt called

## 2019-01-31 ENCOUNTER — OFFICE VISIT (OUTPATIENT)
Dept: DERMATOLOGY CLINIC | Facility: CLINIC | Age: 24
End: 2019-01-31
Payer: COMMERCIAL

## 2019-01-31 DIAGNOSIS — R61 HYPERHIDROSIS: ICD-10-CM

## 2019-01-31 DIAGNOSIS — B07.0 PLANTAR WART: Primary | ICD-10-CM

## 2019-01-31 PROCEDURE — 17110 DESTRUCTION B9 LES UP TO 14: CPT | Performed by: DERMATOLOGY

## 2019-01-31 PROCEDURE — 99213 OFFICE O/P EST LOW 20 MIN: CPT | Performed by: DERMATOLOGY

## 2019-01-31 PROCEDURE — 99212 OFFICE O/P EST SF 10 MIN: CPT | Performed by: DERMATOLOGY

## 2019-01-31 RX ORDER — GLYCOPYRROLATE 1 MG/1
TABLET ORAL
Qty: 90 TABLET | Refills: 3 | Status: SHIPPED | OUTPATIENT
Start: 2019-01-31 | End: 2019-06-05

## 2019-01-31 NOTE — PROGRESS NOTES
HPI:     Chief Complaint     Warts; Derm Problem        HPI     Warts      Additional comments: LOV 9/19/2017. Pt presenting for f/u with warts to R foot. Pt currently using wart peel also previously treated with cryotherapy.               Derm Problem tablet Rfl: 1   SUMATRIPTAN SUCCINATE 50 MG Oral Tab TAKE 1 TABLET BY MOUTH EVERY 2 HOURS AS NEEDED FOR MIGRAINE (MAXIMUM 200 MG IN 24 HRS). Disp: 9 tablet Rfl: 3   Aspirin-Acetaminophen-Caffeine (EXCEDRIN MIGRAINE OR) Take 1 tablet by mouth as needed.  Dis Years of education: Not on file      Highest education level: Not on file    Social Needs      Financial resource strain: Not on file      Food insecurity - worry: Not on file      Food insecurity - inability: Not on file      Transportation needs - medica Other    • Breast Cancer Paternal Aunt 54   • Cancer Paternal Uncle         TESTICULAR CANCER       PHYSICAL EXAM:   Patient is alert and oriented and appears their stated age. They are well-nourished. Exam is remarkable for the following-  1.   There is

## 2019-03-19 ENCOUNTER — TELEPHONE (OUTPATIENT)
Dept: INTERNAL MEDICINE CLINIC | Facility: CLINIC | Age: 24
End: 2019-03-19

## 2019-03-20 RX ORDER — LORAZEPAM 0.5 MG/1
TABLET ORAL
Qty: 60 TABLET | Refills: 2 | Status: SHIPPED
Start: 2019-03-20 | End: 2019-09-17

## 2019-03-21 RX ORDER — LORAZEPAM 0.5 MG/1
TABLET ORAL
Qty: 60 TABLET | OUTPATIENT
Start: 2019-03-21

## 2019-03-21 NOTE — TELEPHONE ENCOUNTER
Current refill request refused due to refill is either a duplicate request or has active refills at the pharmacy. Check previous templates.     Requested Prescriptions     Refused Prescriptions Disp Refills   • LORAZEPAM 0.5 MG Oral Tab [Pharmacy Med Name:

## 2019-06-04 ENCOUNTER — TELEPHONE (OUTPATIENT)
Dept: DERMATOLOGY CLINIC | Facility: CLINIC | Age: 24
End: 2019-06-04

## 2019-06-04 ENCOUNTER — TELEPHONE (OUTPATIENT)
Dept: FAMILY MEDICINE CLINIC | Facility: CLINIC | Age: 24
End: 2019-06-04

## 2019-06-04 DIAGNOSIS — R61 HYPERHIDROSIS: ICD-10-CM

## 2019-06-04 NOTE — TELEPHONE ENCOUNTER
Received reill request.....     GLYCOPYRROLATE 1 MG TABLET  QTY 90.0 EA NINETY  REFILLS 3  TAKE 3 TABLETS BY MOUTH EVERY DAY    Placed in Physician mailbox

## 2019-06-05 RX ORDER — GLYCOPYRROLATE 1 MG/1
TABLET ORAL
Qty: 90 TABLET | Refills: 6 | Status: SHIPPED | OUTPATIENT
Start: 2019-06-05 | End: 2019-12-20

## 2019-07-03 ENCOUNTER — OFFICE VISIT (OUTPATIENT)
Dept: INTERNAL MEDICINE CLINIC | Facility: CLINIC | Age: 24
End: 2019-07-03
Payer: COMMERCIAL

## 2019-07-03 VITALS
BODY MASS INDEX: 29.28 KG/M2 | SYSTOLIC BLOOD PRESSURE: 132 MMHG | HEIGHT: 64 IN | TEMPERATURE: 98 F | OXYGEN SATURATION: 99 % | WEIGHT: 171.5 LBS | HEART RATE: 88 BPM | DIASTOLIC BLOOD PRESSURE: 78 MMHG

## 2019-07-03 DIAGNOSIS — L65.9 ALOPECIA: ICD-10-CM

## 2019-07-03 DIAGNOSIS — R63.5 WEIGHT GAIN: Primary | ICD-10-CM

## 2019-07-03 DIAGNOSIS — R53.83 OTHER FATIGUE: ICD-10-CM

## 2019-07-03 DIAGNOSIS — R20.2 PARESTHESIA: ICD-10-CM

## 2019-07-03 PROCEDURE — 99214 OFFICE O/P EST MOD 30 MIN: CPT | Performed by: INTERNAL MEDICINE

## 2019-07-03 NOTE — PROGRESS NOTES
Belia Barkley is a 21year old female. HPI:   Patient presents with:  Weight Loss Gain (metabolic): Here today with c/o weight gain, hair loss and tingling/burning feeling in finger tips.       20 y/o F with c/o weight gain over several months; has gai DISEASE   • Diabetes Other    • Breast Cancer Paternal Aunt 54   • Cancer Paternal Uncle         TESTICULAR CANCER      Social History: Social History    Tobacco Use      Smoking status: Never Smoker      Smokeless tobacco: Never Used      Tobacco comment: alert and oriented x3 in no acute distress  HEENT- EOMI, PERRL  Nose/Mouth/Throat: pharynx without erythema; no oral lesions  Neck/Thyroid: neck supple; no thyromegaly  Cardiovascular: RRR, S1, S2, no S3 or murmur  Respiratory: lungs without crackles or wh testing to environmental allergens to screen for potential allergic triggers was positive to dust mites, dogs; patient has 4 dogs at home ; advise stop amitriptyline; HA improved on Claritin 10 mg po qD    Anxiety   on venlafaxine  mg po qD, and micki

## 2019-07-06 ENCOUNTER — LAB ENCOUNTER (OUTPATIENT)
Dept: LAB | Age: 24
End: 2019-07-06
Attending: INTERNAL MEDICINE
Payer: COMMERCIAL

## 2019-07-06 DIAGNOSIS — R63.5 WEIGHT GAIN: ICD-10-CM

## 2019-07-06 DIAGNOSIS — R53.83 OTHER FATIGUE: ICD-10-CM

## 2019-07-06 DIAGNOSIS — Z00.00 ANNUAL PHYSICAL EXAM: ICD-10-CM

## 2019-07-06 DIAGNOSIS — R20.2 PARESTHESIA: ICD-10-CM

## 2019-07-06 DIAGNOSIS — L65.9 ALOPECIA: ICD-10-CM

## 2019-07-06 LAB
ALBUMIN SERPL-MCNC: 4 G/DL (ref 3.4–5)
ALBUMIN/GLOB SERPL: 1.2 {RATIO} (ref 1–2)
ALP LIVER SERPL-CCNC: 76 U/L (ref 37–98)
ALT SERPL-CCNC: 22 U/L (ref 13–56)
ANION GAP SERPL CALC-SCNC: 5 MMOL/L (ref 0–18)
AST SERPL-CCNC: 17 U/L (ref 15–37)
BASOPHILS # BLD AUTO: 0.05 X10(3) UL (ref 0–0.2)
BASOPHILS NFR BLD AUTO: 0.7 %
BILIRUB SERPL-MCNC: 0.3 MG/DL (ref 0.1–2)
BUN BLD-MCNC: 14 MG/DL (ref 7–18)
BUN/CREAT SERPL: 21.5 (ref 10–20)
CALCIUM BLD-MCNC: 9.2 MG/DL (ref 8.5–10.1)
CHLORIDE SERPL-SCNC: 111 MMOL/L (ref 98–112)
CO2 SERPL-SCNC: 27 MMOL/L (ref 21–32)
CREAT BLD-MCNC: 0.65 MG/DL (ref 0.55–1.02)
DEPRECATED RDW RBC AUTO: 39.7 FL (ref 35.1–46.3)
EOSINOPHIL # BLD AUTO: 0.06 X10(3) UL (ref 0–0.7)
EOSINOPHIL NFR BLD AUTO: 0.8 %
ERYTHROCYTE [DISTWIDTH] IN BLOOD BY AUTOMATED COUNT: 12.2 % (ref 11–15)
FOLATE SERPL-MCNC: 18.7 NG/ML (ref 8.7–?)
GLOBULIN PLAS-MCNC: 3.4 G/DL (ref 2.8–4.4)
GLUCOSE BLD-MCNC: 88 MG/DL (ref 70–99)
HBV SURFACE AB SER QL: REACTIVE
HBV SURFACE AB SERPL IA-ACNC: 21 MIU/ML
HCT VFR BLD AUTO: 41.5 % (ref 35–48)
HGB BLD-MCNC: 13.4 G/DL (ref 12–16)
IMM GRANULOCYTES # BLD AUTO: 0.02 X10(3) UL (ref 0–1)
IMM GRANULOCYTES NFR BLD: 0.3 %
LYMPHOCYTES # BLD AUTO: 1.75 X10(3) UL (ref 1–4)
LYMPHOCYTES NFR BLD AUTO: 23.4 %
M PROTEIN MFR SERPL ELPH: 7.4 G/DL (ref 6.4–8.2)
MCH RBC QN AUTO: 28.7 PG (ref 26–34)
MCHC RBC AUTO-ENTMCNC: 32.3 G/DL (ref 31–37)
MCV RBC AUTO: 88.9 FL (ref 80–100)
MONOCYTES # BLD AUTO: 0.5 X10(3) UL (ref 0.1–1)
MONOCYTES NFR BLD AUTO: 6.7 %
NEUTROPHILS # BLD AUTO: 5.11 X10 (3) UL (ref 1.5–7.7)
NEUTROPHILS # BLD AUTO: 5.11 X10(3) UL (ref 1.5–7.7)
NEUTROPHILS NFR BLD AUTO: 68.1 %
OSMOLALITY SERPL CALC.SUM OF ELEC: 296 MOSM/KG (ref 275–295)
PATIENT FASTING: YES
PLATELET # BLD AUTO: 366 10(3)UL (ref 150–450)
POTASSIUM SERPL-SCNC: 4.3 MMOL/L (ref 3.5–5.1)
RBC # BLD AUTO: 4.67 X10(6)UL (ref 3.8–5.3)
RUBV IGG SER QL: POSITIVE
RUBV IGG SER-ACNC: 194.8 IU/ML (ref 10–?)
SODIUM SERPL-SCNC: 143 MMOL/L (ref 136–145)
TSI SER-ACNC: 1.66 MIU/ML (ref 0.36–3.74)
VIT B12 SERPL-MCNC: 289 PG/ML (ref 193–986)
WBC # BLD AUTO: 7.5 X10(3) UL (ref 4–11)

## 2019-07-06 PROCEDURE — 82607 VITAMIN B-12: CPT

## 2019-07-06 PROCEDURE — 86038 ANTINUCLEAR ANTIBODIES: CPT

## 2019-07-06 PROCEDURE — 86706 HEP B SURFACE ANTIBODY: CPT

## 2019-07-06 PROCEDURE — 86762 RUBELLA ANTIBODY: CPT

## 2019-07-06 PROCEDURE — 80053 COMPREHEN METABOLIC PANEL: CPT

## 2019-07-06 PROCEDURE — 82746 ASSAY OF FOLIC ACID SERUM: CPT

## 2019-07-06 PROCEDURE — 86765 RUBEOLA ANTIBODY: CPT | Performed by: INTERNAL MEDICINE

## 2019-07-06 PROCEDURE — 84443 ASSAY THYROID STIM HORMONE: CPT

## 2019-07-06 PROCEDURE — 86787 VARICELLA-ZOSTER ANTIBODY: CPT

## 2019-07-06 PROCEDURE — 36415 COLL VENOUS BLD VENIPUNCTURE: CPT | Performed by: INTERNAL MEDICINE

## 2019-07-06 PROCEDURE — 86735 MUMPS ANTIBODY: CPT

## 2019-07-06 PROCEDURE — 85025 COMPLETE CBC W/AUTO DIFF WBC: CPT

## 2019-07-08 LAB
MEV IGG SER-ACNC: >300 AU/ML (ref 30–?)
MUV IGG SER IA-ACNC: >300 AU/ML (ref 11–?)
NUCLEAR IGG TITR SER IF: NEGATIVE {TITER}
VZV IGG SER IA-ACNC: >4000 (ref 165–?)

## 2019-07-25 ENCOUNTER — OFFICE VISIT (OUTPATIENT)
Dept: OBGYN CLINIC | Facility: CLINIC | Age: 24
End: 2019-07-25
Payer: COMMERCIAL

## 2019-07-25 VITALS
DIASTOLIC BLOOD PRESSURE: 83 MMHG | HEART RATE: 76 BPM | SYSTOLIC BLOOD PRESSURE: 123 MMHG | BODY MASS INDEX: 29 KG/M2 | WEIGHT: 170 LBS

## 2019-07-25 DIAGNOSIS — N94.10 FEMALE DYSPAREUNIA: Primary | ICD-10-CM

## 2019-07-25 PROCEDURE — 99213 OFFICE O/P EST LOW 20 MIN: CPT | Performed by: CLINICAL NURSE SPECIALIST

## 2019-07-26 LAB
C TRACH DNA SPEC QL NAA+PROBE: NEGATIVE
N GONORRHOEA DNA SPEC QL NAA+PROBE: NEGATIVE

## 2019-07-27 LAB
GENITAL VAGINOSIS SCREEN: NEGATIVE
TRICHOMONAS SCREEN: NEGATIVE

## 2019-07-28 NOTE — PROGRESS NOTES
Enoch Bob is a 21year old female [facility] Patient's last menstrual period was 07/22/2019. Patient presents with:  Gyn Exam: Painful intercourse for 2 months. Here with c/o painful intercourse for the last 2 months.  Does not feel its a lubrication i Male        Birth control/protection: Condom        Comment: same partner    Lifestyle      Physical activity:        Days per week: Not on file        Minutes per session: Not on file      Stress: Not on file    Relationships      Social connections: Rfl:   •  Venlafaxine HCl ER 75 MG Oral Tablet 24 Hr, Take 1 tablet (75 mg total) by mouth daily. , Disp: 30 tablet, Rfl: 3  •  TraZODone HCl 50 MG Oral Tab, TAKE 1 TABLET BY MOUTH EVERY EVENING, Disp: 30 tablet, Rfl: 11  •  gabapentin 300 MG Oral Cap, TAKE STD testing and vaginal culture done  Recommend she try replense for moisture and lubricant with intercourse  Continue to try position changes.  If cervix is being hit, I cant change anatomy  Discussed role of stress on libido  Discussed pelvic floor

## 2019-07-29 ENCOUNTER — TELEPHONE (OUTPATIENT)
Dept: OBGYN CLINIC | Facility: CLINIC | Age: 24
End: 2019-07-29

## 2019-07-29 RX ORDER — FLUCONAZOLE 150 MG/1
TABLET ORAL
Qty: 2 TABLET | Refills: 0 | Status: SHIPPED | OUTPATIENT
Start: 2019-07-29 | End: 2019-10-10

## 2019-07-29 NOTE — TELEPHONE ENCOUNTER
----- Message from NEFTALI Fowler sent at 7/29/2019  6:03 AM CDT -----  Please let pt know vaginal culture is + for yeast and send Rx for Diflucan 150 mg po x 1 now and repeat in 72 hrs. STD testing is negative.     MAF

## 2019-08-01 ENCOUNTER — PATIENT MESSAGE (OUTPATIENT)
Dept: INTERNAL MEDICINE CLINIC | Facility: CLINIC | Age: 24
End: 2019-08-01

## 2019-08-01 ENCOUNTER — TELEPHONE (OUTPATIENT)
Dept: INTERNAL MEDICINE CLINIC | Facility: CLINIC | Age: 24
End: 2019-08-01

## 2019-08-01 NOTE — TELEPHONE ENCOUNTER
Message copied from My Chart message 8/1: \"From: Nataliya Steel  To: Torrey Bagley MD  Sent: 8/1/2019  5:27 PM CDT  Subject: Test Results Question    Hi Dr. Cammy Llanes,     I saw my labs all came back normal. The burning/numbness in my fingertips is g

## 2019-08-01 NOTE — TELEPHONE ENCOUNTER
From: Lu Perdomo  To: Charo Livingston MD  Sent: 8/1/2019 5:27 PM CDT  Subject: Test Results Question    Hi Dr. Christopher Le,     I saw my labs all came back normal. The burning/numbness in my fingertips is getting increasingly worse.  Just wondering if

## 2019-08-01 NOTE — TELEPHONE ENCOUNTER
Imp- dysesthesias to fingertips of both hands; denies numbness; denies weakness to hands; dysesthesias do not extend past the wrist; sxs x 6-12 months;  rec- hold glycopyrrolate x one week to see if sxs macey; if sxs persist, consider EMG/ NCV for further

## 2019-08-01 NOTE — TELEPHONE ENCOUNTER
Patient called back. She confirmed her message below that her burning/numbness in her fingertips is getting worse. She states she can barely hold the phone. She can't really hold the steering wheel.  Even touching her finger tips to each other is painful

## 2019-08-16 ENCOUNTER — TELEPHONE (OUTPATIENT)
Dept: INTERNAL MEDICINE CLINIC | Facility: CLINIC | Age: 24
End: 2019-08-16

## 2019-08-16 RX ORDER — GABAPENTIN 300 MG/1
CAPSULE ORAL
Qty: 60 CAPSULE | Refills: 11 | Status: SHIPPED | OUTPATIENT
Start: 2019-08-16 | End: 2020-06-30

## 2019-08-16 RX ORDER — VENLAFAXINE HYDROCHLORIDE 150 MG/1
TABLET, EXTENDED RELEASE ORAL
Qty: 30 TABLET | Refills: 11 | Status: SHIPPED | OUTPATIENT
Start: 2019-08-16 | End: 2020-03-09

## 2019-08-16 RX ORDER — TRAZODONE HYDROCHLORIDE 50 MG/1
TABLET ORAL
Qty: 30 TABLET | Refills: 11 | Status: SHIPPED | OUTPATIENT
Start: 2019-08-16 | End: 2020-08-03

## 2019-08-20 ENCOUNTER — TELEPHONE (OUTPATIENT)
Dept: OBGYN CLINIC | Facility: CLINIC | Age: 24
End: 2019-08-20

## 2019-09-12 ENCOUNTER — OFFICE VISIT (OUTPATIENT)
Dept: OBGYN CLINIC | Facility: CLINIC | Age: 24
End: 2019-09-12
Payer: COMMERCIAL

## 2019-09-12 VITALS
WEIGHT: 175.63 LBS | BODY MASS INDEX: 30 KG/M2 | HEART RATE: 73 BPM | DIASTOLIC BLOOD PRESSURE: 85 MMHG | SYSTOLIC BLOOD PRESSURE: 132 MMHG

## 2019-09-12 DIAGNOSIS — Z30.46 NEXPLANON REMOVAL: Primary | ICD-10-CM

## 2019-09-12 PROCEDURE — 11976 REMOVE CONTRACEPTIVE CAPSULE: CPT | Performed by: CLINICAL NURSE SPECIALIST

## 2019-09-12 RX ORDER — AMOXICILLIN 875 MG/1
875 TABLET, COATED ORAL
COMMUNITY
Start: 2019-09-09 | End: 2019-09-19

## 2019-09-12 RX ORDER — BENZONATATE 100 MG/1
CAPSULE ORAL
COMMUNITY
Start: 2019-09-09 | End: 2019-09-16

## 2019-09-12 RX ORDER — NORETHINDRONE ACETATE AND ETHINYL ESTRADIOL AND FERROUS FUMARATE 1MG-20(24)
1 KIT ORAL DAILY
Qty: 28 TABLET | Refills: 6 | Status: SHIPPED | OUTPATIENT
Start: 2019-09-12 | End: 2020-01-20

## 2019-09-13 NOTE — PROCEDURES
Nexplanon Removal  Not happy with Nexplanon d/t irregular bleeding and wants it removed. Would like to restart pill. Removal:  2 % lidocaine with Epinephrine was injected underneath the tip of the Nexplanon fazal that is closest to the left elbow.   Angélica Thapa

## 2019-09-17 NOTE — TELEPHONE ENCOUNTER
To MD:  The above refill request is for a controlled substance. Please review pended medication order. Print and sign for staff to fax to pharmacy.     Simi Arboleda  - 7/2/2019 #60    Last refilled - 3/20/2019 #60/2

## 2019-09-19 RX ORDER — LORAZEPAM 0.5 MG/1
TABLET ORAL
Qty: 60 TABLET | Refills: 3 | Status: SHIPPED | OUTPATIENT
Start: 2019-09-19 | End: 2020-04-13

## 2019-10-08 ENCOUNTER — OFFICE VISIT (OUTPATIENT)
Dept: PULMONOLOGY | Facility: CLINIC | Age: 24
End: 2019-10-08
Payer: COMMERCIAL

## 2019-10-08 VITALS
SYSTOLIC BLOOD PRESSURE: 133 MMHG | OXYGEN SATURATION: 99 % | HEART RATE: 74 BPM | DIASTOLIC BLOOD PRESSURE: 86 MMHG | HEIGHT: 64 IN | RESPIRATION RATE: 18 BRPM | BODY MASS INDEX: 30.22 KG/M2 | WEIGHT: 177 LBS

## 2019-10-08 DIAGNOSIS — G47.00 INSOMNIA, UNSPECIFIED TYPE: Primary | ICD-10-CM

## 2019-10-08 PROCEDURE — 99203 OFFICE O/P NEW LOW 30 MIN: CPT | Performed by: INTERNAL MEDICINE

## 2019-10-08 NOTE — PROGRESS NOTES
Dear Sally Jeff:           As you know, Margrett Lesches is a 48-year-old female who I am now evaluating for chronic insomnia. HISTORY OF PRESENT ILLNESS: The patient notes that since she was a toddler she has never been able to fall asleep.   Her history began with p morning. There is occasional alcohol. She is gained 10 pounds in the last 8 months. There is morning headache but no depressed mood and the [place] Sleepiness Scale score is 1 out of 24. PAST MEDICAL AND SURGICAL HISTORY:   1.   Insomnia IBS migraine medication as she is taking trazodone 50 mg and Neurontin 300 mg nightly. This cocktail is working for her, but she wants to take less medication.   I am simply suggesting that she try going without the Neurontin for now and see how she does using the traz

## 2019-10-10 ENCOUNTER — OFFICE VISIT (OUTPATIENT)
Dept: INTERNAL MEDICINE CLINIC | Facility: CLINIC | Age: 24
End: 2019-10-10
Payer: COMMERCIAL

## 2019-10-10 VITALS
DIASTOLIC BLOOD PRESSURE: 78 MMHG | WEIGHT: 173.88 LBS | SYSTOLIC BLOOD PRESSURE: 118 MMHG | HEIGHT: 64.5 IN | BODY MASS INDEX: 29.32 KG/M2 | HEART RATE: 72 BPM

## 2019-10-10 DIAGNOSIS — R53.82 CHRONIC FATIGUE: ICD-10-CM

## 2019-10-10 DIAGNOSIS — M79.7 FIBROMYALGIA: ICD-10-CM

## 2019-10-10 DIAGNOSIS — Z51.81 THERAPEUTIC DRUG MONITORING: Primary | ICD-10-CM

## 2019-10-10 DIAGNOSIS — E66.9 OBESITY (BMI 30-39.9): ICD-10-CM

## 2019-10-10 DIAGNOSIS — E53.8 B12 DEFICIENCY: ICD-10-CM

## 2019-10-10 PROCEDURE — 99204 OFFICE O/P NEW MOD 45 MIN: CPT | Performed by: INTERNAL MEDICINE

## 2019-10-10 PROCEDURE — 93000 ELECTROCARDIOGRAM COMPLETE: CPT | Performed by: INTERNAL MEDICINE

## 2019-10-10 PROCEDURE — 96372 THER/PROPH/DIAG INJ SC/IM: CPT | Performed by: INTERNAL MEDICINE

## 2019-10-10 RX ORDER — PHENTERMINE HYDROCHLORIDE 15 MG/1
15 CAPSULE ORAL EVERY MORNING
Qty: 30 CAPSULE | Refills: 1 | Status: SHIPPED | OUTPATIENT
Start: 2019-10-10 | End: 2019-11-06

## 2019-10-10 RX ORDER — MINOCYCLINE HYDROCHLORIDE 100 MG/1
1 CAPSULE ORAL 2 TIMES DAILY
Refills: 1 | COMMUNITY
Start: 2019-09-21 | End: 2020-05-26

## 2019-10-10 RX ORDER — CYANOCOBALAMIN 1000 UG/ML
1000 INJECTION INTRAMUSCULAR; SUBCUTANEOUS ONCE
Status: COMPLETED | OUTPATIENT
Start: 2019-10-10 | End: 2019-10-10

## 2019-10-10 RX ADMIN — CYANOCOBALAMIN 1000 MCG: 1000 INJECTION INTRAMUSCULAR; SUBCUTANEOUS at 13:02:00

## 2019-10-10 NOTE — PATIENT INSTRUCTIONS
Plan:  Continue with medications:   Go to the lab for blood work   Follow up with me in 1 month  Schedule follow up appointments: Ruchi Mclean (dietitian) or Cyn Burkett (dietitian)   Check for insurance coverage for dietitian and labwork prior to sche -cured meats (monitor for sodium issues)   -hummus with vegetables  -bean dip with vegetables    FRUIT  Low carb fruit options   Raspberries: Half a cup (60 grams) contains 3 grams of carbs. Blackberries: Half a cup (70 grams) contains 4 grams of carbs.

## 2019-10-10 NOTE — PROGRESS NOTES
HISTORY OF PRESENT ILLNESS  Patient presents with:  Weight Problem: [employer], would like meds - no previous attempt      Mary Tobin is a 21year old female new to our office today for initiation of medical weight loss program.  Patient presents today with c GENERAL: feels well otherwise,   NECK: denies thickening   LUNGS: denies shortness of breath with exertion, no apnea   CARDIOVASCULAR: denies chest pain on exertion , denies palpitations or pedal edema  GI: denies abdominal pain.   No N/V/D/C  MUSCULOSKELET K 4.3 07/06/2019     07/06/2019    CO2 27.0 07/06/2019     Lab Results   Component Value Date    A1C 5.1 04/30/2018     Lab Results   Component Value Date    CHOLEST 194 07/15/2018    TRIG 49 07/15/2018    HDL 60 07/15/2018     (H) 07/15/2018 -     cyanocobalamin (VITAMIN B12) 1000 MCG/ML injection 1,000 mcg    Other orders  -     Cancel: VITAMIN B12; Future  -     Cancel: TSH+FREE T4; Future  -     Cancel: HEMOGLOBIN A1C; Future  -     Phentermine HCl 15 MG Oral Cap;  Take 1 capsule (15 mg tota 5. Reduce carbohydrates which includes sweets as well as rice, pasta, potatoes, bread, corn and instead choose whole grain options or more protein or vegetables. 6. Get a good night of sleep  7. Try to decrease stress in life    Please download apps:  1.

## 2019-10-12 ENCOUNTER — APPOINTMENT (OUTPATIENT)
Dept: LAB | Age: 24
End: 2019-10-12
Attending: INTERNAL MEDICINE
Payer: COMMERCIAL

## 2019-10-12 DIAGNOSIS — Z51.81 THERAPEUTIC DRUG MONITORING: ICD-10-CM

## 2019-10-12 PROCEDURE — 80061 LIPID PANEL: CPT

## 2019-10-12 PROCEDURE — 82306 VITAMIN D 25 HYDROXY: CPT

## 2019-10-12 PROCEDURE — 36415 COLL VENOUS BLD VENIPUNCTURE: CPT

## 2019-10-15 ENCOUNTER — TELEPHONE (OUTPATIENT)
Dept: INTERNAL MEDICINE CLINIC | Facility: CLINIC | Age: 24
End: 2019-10-15

## 2019-10-15 NOTE — TELEPHONE ENCOUNTER
Pt called returning message from [employer] for test results  Pt states she has an appt and will be available by 345

## 2019-10-16 RX ORDER — CHOLECALCIFEROL (VITAMIN D3) 1250 MCG
1 CAPSULE ORAL WEEKLY
Qty: 12 CAPSULE | Refills: 0 | Status: SHIPPED | OUTPATIENT
Start: 2019-10-16 | End: 2020-01-14

## 2019-11-06 ENCOUNTER — OFFICE VISIT (OUTPATIENT)
Dept: INTERNAL MEDICINE CLINIC | Facility: CLINIC | Age: 24
End: 2019-11-06
Payer: COMMERCIAL

## 2019-11-06 VITALS
WEIGHT: 168 LBS | RESPIRATION RATE: 16 BRPM | SYSTOLIC BLOOD PRESSURE: 122 MMHG | HEART RATE: 76 BPM | DIASTOLIC BLOOD PRESSURE: 74 MMHG | HEIGHT: 64.5 IN | BODY MASS INDEX: 28.33 KG/M2

## 2019-11-06 DIAGNOSIS — Z51.81 THERAPEUTIC DRUG MONITORING: Primary | ICD-10-CM

## 2019-11-06 DIAGNOSIS — E66.9 OBESITY (BMI 30-39.9): ICD-10-CM

## 2019-11-06 DIAGNOSIS — E53.8 B12 DEFICIENCY: ICD-10-CM

## 2019-11-06 DIAGNOSIS — E55.9 VITAMIN D DEFICIENCY: ICD-10-CM

## 2019-11-06 PROCEDURE — 96372 THER/PROPH/DIAG INJ SC/IM: CPT | Performed by: INTERNAL MEDICINE

## 2019-11-06 PROCEDURE — 99214 OFFICE O/P EST MOD 30 MIN: CPT | Performed by: INTERNAL MEDICINE

## 2019-11-06 RX ORDER — CYANOCOBALAMIN 1000 UG/ML
1000 INJECTION INTRAMUSCULAR; SUBCUTANEOUS ONCE
Status: COMPLETED | OUTPATIENT
Start: 2019-11-06 | End: 2019-11-06

## 2019-11-06 RX ORDER — PHENTERMINE HYDROCHLORIDE 15 MG/1
15 CAPSULE ORAL 2 TIMES DAILY
Qty: 60 CAPSULE | Refills: 1 | Status: SHIPPED | OUTPATIENT
Start: 2019-11-06 | End: 2020-01-20

## 2019-11-06 RX ORDER — PHENTERMINE HYDROCHLORIDE 15 MG/1
15 CAPSULE ORAL 2 TIMES DAILY
Qty: 60 CAPSULE | Refills: 0 | Status: SHIPPED | OUTPATIENT
Start: 2019-11-06 | End: 2020-01-20

## 2019-11-06 RX ADMIN — CYANOCOBALAMIN 1000 MCG: 1000 INJECTION INTRAMUSCULAR; SUBCUTANEOUS at 16:08:00

## 2019-11-06 NOTE — PROGRESS NOTES
HISTORY OF PRESENT ILLNESS  Patient presents with:  Weight Check: down 5 lb      Lu Perdomo is a 21year old female here for follow up in medical weight loss program.     Denies chest pain, shortness of breath, dizziness, blurred vision, headache, par ALKPHO 76 07/06/2019    AST 17 07/06/2019    ALT 22 07/06/2019    BILT 0.3 07/06/2019    TP 7.4 07/06/2019    ALB 4.0 07/06/2019    GLOBULIN 3.4 07/06/2019    AGRATIO 1.6 01/28/2014     07/06/2019    K 4.3 07/06/2019     07/06/2019    CO2 27.0 Tab, Take 1 tablet by mouth nightly., Disp: 90 tablet, Rfl: 3    No current facility-administered medications on file prior to visit.        ASSESSMENT  Analyzed weight data:       Diagnoses and all orders for this visit:    Therapeutic drug monitoring    O

## 2019-11-25 ENCOUNTER — TELEPHONE (OUTPATIENT)
Dept: INTERNAL MEDICINE CLINIC | Facility: CLINIC | Age: 24
End: 2019-11-25

## 2019-11-25 DIAGNOSIS — E66.09 OBESITY DUE TO EXCESS CALORIES WITHOUT SERIOUS COMORBIDITY, UNSPECIFIED CLASSIFICATION: Primary | ICD-10-CM

## 2019-11-25 NOTE — TELEPHONE ENCOUNTER
Tammy Sparrow, RN             Jamie Munoz,     Patient has initial Dietitian appt on 11/27 but there is no Dietitian referral in [place]. Can you enter one for me so I know what DX is when I call insurance?       Referral placed  obesity

## 2019-12-02 ENCOUNTER — TELEPHONE (OUTPATIENT)
Dept: DERMATOLOGY CLINIC | Facility: CLINIC | Age: 24
End: 2019-12-02

## 2019-12-02 NOTE — TELEPHONE ENCOUNTER
S/w [facility], pt does have 1 refill left of qty 90 and will get that ready for her, I did try to call pt but her VM is full, I did let [facility] know that pt needs F/U in Jan and to please let he know.

## 2019-12-02 NOTE — TELEPHONE ENCOUNTER
May have 90 tablets and no refills. Needs follow-up in January of note patient was given refills last June(1 month supply +6 additional refills) that should last her into mid January.

## 2019-12-02 NOTE — TELEPHONE ENCOUNTER
LOV 1/31/19 by JERMAINES, seems pt is also been seen by Dr. Marsha Iyer on 11/12/19 - please advise if you'd like to refill her robinul.

## 2019-12-02 NOTE — TELEPHONE ENCOUNTER
Current Outpatient Medications   Medication Sig Dispense Refill   •        •       •       •       •        •       •       •       •       •       • glycopyrrolate 1 MG Oral Tab TAKE 3 TABLETS BY MOUTH DAILY.  90 tablet 6   •         •

## 2019-12-20 DIAGNOSIS — R61 HYPERHIDROSIS: ICD-10-CM

## 2019-12-20 RX ORDER — GLYCOPYRROLATE 1 MG/1
TABLET ORAL
Qty: 90 TABLET | Refills: 0 | Status: SHIPPED | OUTPATIENT
Start: 2019-12-20 | End: 2020-01-20

## 2019-12-20 NOTE — TELEPHONE ENCOUNTER
Received refill request for glycopyrrolate. Last filled 12/2 #90. Left message for pt to schedule follow up at that time and again today. She has not scheduled as of yet.   Please advise on refill request.

## 2019-12-30 NOTE — TELEPHONE ENCOUNTER
[email or web address] to SHERRONnickie [email or web address] [employer], #[phone], Ref#name of rep, date& time of call.  They verified that patient has following benefits for below services:   [facility] [address],[address] [facility] (N#[id number])  Maia

## 2020-01-06 ENCOUNTER — APPOINTMENT (OUTPATIENT)
Dept: CT IMAGING | Facility: HOSPITAL | Age: 25
End: 2020-01-06
Attending: NURSE PRACTITIONER
Payer: COMMERCIAL

## 2020-01-06 ENCOUNTER — HOSPITAL ENCOUNTER (OUTPATIENT)
Age: 25
Discharge: ANOTHER HOSPITAL | End: 2020-01-06
Attending: EMERGENCY MEDICINE
Payer: COMMERCIAL

## 2020-01-06 ENCOUNTER — HOSPITAL ENCOUNTER (EMERGENCY)
Facility: HOSPITAL | Age: 25
Discharge: HOME | End: 2020-01-06
Payer: COMMERCIAL

## 2020-01-06 VITALS
HEIGHT: 64 IN | BODY MASS INDEX: 28.51 KG/M2 | OXYGEN SATURATION: 98 % | SYSTOLIC BLOOD PRESSURE: 125 MMHG | DIASTOLIC BLOOD PRESSURE: 90 MMHG | WEIGHT: 167 LBS | RESPIRATION RATE: 18 BRPM | HEART RATE: 93 BPM | TEMPERATURE: 97 F

## 2020-01-06 VITALS
RESPIRATION RATE: 16 BRPM | SYSTOLIC BLOOD PRESSURE: 118 MMHG | TEMPERATURE: 99 F | DIASTOLIC BLOOD PRESSURE: 82 MMHG | OXYGEN SATURATION: 100 % | HEART RATE: 107 BPM

## 2020-01-06 DIAGNOSIS — R10.9 ABDOMINAL PAIN OF UNKNOWN ETIOLOGY: Primary | ICD-10-CM

## 2020-01-06 DIAGNOSIS — R11.2 NON-INTRACTABLE VOMITING WITH NAUSEA, UNSPECIFIED VOMITING TYPE: ICD-10-CM

## 2020-01-06 DIAGNOSIS — M54.50 LOW BACK PAIN AT MULTIPLE SITES: ICD-10-CM

## 2020-01-06 DIAGNOSIS — R11.2 NAUSEA AND VOMITING, INTRACTABILITY OF VOMITING NOT SPECIFIED, UNSPECIFIED VOMITING TYPE: Primary | ICD-10-CM

## 2020-01-06 LAB
ANION GAP SERPL CALC-SCNC: 7 MMOL/L (ref 0–18)
B-HCG UR QL: NEGATIVE
BASOPHILS # BLD AUTO: 0.04 X10(3) UL (ref 0–0.2)
BASOPHILS NFR BLD AUTO: 0.2 %
BILIRUB UR QL: NEGATIVE
BUN BLD-MCNC: 15 MG/DL (ref 7–18)
BUN/CREAT SERPL: 20.5 (ref 10–20)
CALCIUM BLD-MCNC: 9.2 MG/DL (ref 8.5–10.1)
CHLORIDE SERPL-SCNC: 108 MMOL/L (ref 98–112)
CLARITY UR: CLEAR
CO2 SERPL-SCNC: 26 MMOL/L (ref 21–32)
COLOR UR: YELLOW
CREAT BLD-MCNC: 0.73 MG/DL (ref 0.55–1.02)
DEPRECATED RDW RBC AUTO: 40 FL (ref 35.1–46.3)
EOSINOPHIL # BLD AUTO: 0 X10(3) UL (ref 0–0.7)
EOSINOPHIL NFR BLD AUTO: 0 %
ERYTHROCYTE [DISTWIDTH] IN BLOOD BY AUTOMATED COUNT: 12.8 % (ref 11–15)
GLUCOSE BLD-MCNC: 113 MG/DL (ref 70–99)
GLUCOSE UR-MCNC: NEGATIVE MG/DL
HCT VFR BLD AUTO: 43.3 % (ref 35–48)
HGB BLD-MCNC: 14.5 G/DL (ref 12–16)
HGB UR QL STRIP.AUTO: NEGATIVE
IMM GRANULOCYTES # BLD AUTO: 0.06 X10(3) UL (ref 0–1)
IMM GRANULOCYTES NFR BLD: 0.3 %
KETONES UR-MCNC: 20 MG/DL
LEUKOCYTE ESTERASE UR QL STRIP.AUTO: NEGATIVE
LYMPHOCYTES # BLD AUTO: 0.26 X10(3) UL (ref 1–4)
LYMPHOCYTES NFR BLD AUTO: 1.4 %
MCH RBC QN AUTO: 28.9 PG (ref 26–34)
MCHC RBC AUTO-ENTMCNC: 33.5 G/DL (ref 31–37)
MCV RBC AUTO: 86.4 FL (ref 80–100)
MONOCYTES # BLD AUTO: 0.48 X10(3) UL (ref 0.1–1)
MONOCYTES NFR BLD AUTO: 2.6 %
NEUTROPHILS # BLD AUTO: 17.3 X10 (3) UL (ref 1.5–7.7)
NEUTROPHILS # BLD AUTO: 17.3 X10(3) UL (ref 1.5–7.7)
NEUTROPHILS NFR BLD AUTO: 95.5 %
NITRITE UR QL STRIP.AUTO: NEGATIVE
OSMOLALITY SERPL CALC.SUM OF ELEC: 294 MOSM/KG (ref 275–295)
PH UR: 7 [PH] (ref 5–8)
PLATELET # BLD AUTO: 360 10(3)UL (ref 150–450)
POTASSIUM SERPL-SCNC: 3.6 MMOL/L (ref 3.5–5.1)
PROT UR-MCNC: 30 MG/DL
RBC # BLD AUTO: 5.01 X10(6)UL (ref 3.8–5.3)
RBC #/AREA URNS AUTO: 1 /HPF
SODIUM SERPL-SCNC: 141 MMOL/L (ref 136–145)
SP GR UR STRIP: 1.03 (ref 1–1.03)
UROBILINOGEN UR STRIP-ACNC: <2
WBC # BLD AUTO: 18.1 X10(3) UL (ref 4–11)
WBC #/AREA URNS AUTO: 1 /HPF

## 2020-01-06 PROCEDURE — 96361 HYDRATE IV INFUSION ADD-ON: CPT

## 2020-01-06 PROCEDURE — 93005 ELECTROCARDIOGRAM TRACING: CPT

## 2020-01-06 PROCEDURE — 74177 CT ABD & PELVIS W/CONTRAST: CPT | Performed by: NURSE PRACTITIONER

## 2020-01-06 PROCEDURE — 99213 OFFICE O/P EST LOW 20 MIN: CPT

## 2020-01-06 PROCEDURE — 96374 THER/PROPH/DIAG INJ IV PUSH: CPT

## 2020-01-06 PROCEDURE — 96375 TX/PRO/DX INJ NEW DRUG ADDON: CPT

## 2020-01-06 PROCEDURE — 80048 BASIC METABOLIC PNL TOTAL CA: CPT

## 2020-01-06 PROCEDURE — 93010 ELECTROCARDIOGRAM REPORT: CPT | Performed by: NURSE PRACTITIONER

## 2020-01-06 PROCEDURE — 81025 URINE PREGNANCY TEST: CPT

## 2020-01-06 PROCEDURE — 99285 EMERGENCY DEPT VISIT HI MDM: CPT

## 2020-01-06 PROCEDURE — 99214 OFFICE O/P EST MOD 30 MIN: CPT

## 2020-01-06 PROCEDURE — 81001 URINALYSIS AUTO W/SCOPE: CPT

## 2020-01-06 PROCEDURE — 85025 COMPLETE CBC W/AUTO DIFF WBC: CPT

## 2020-01-06 RX ORDER — LORAZEPAM 2 MG/ML
1 INJECTION INTRAMUSCULAR ONCE
Status: COMPLETED | OUTPATIENT
Start: 2020-01-06 | End: 2020-01-06

## 2020-01-06 RX ORDER — ONDANSETRON 4 MG/1
4 TABLET, ORALLY DISINTEGRATING ORAL ONCE
Status: COMPLETED | OUTPATIENT
Start: 2020-01-06 | End: 2020-01-06

## 2020-01-06 RX ORDER — ONDANSETRON 2 MG/ML
4 INJECTION INTRAMUSCULAR; INTRAVENOUS ONCE
Status: COMPLETED | OUTPATIENT
Start: 2020-01-06 | End: 2020-01-06

## 2020-01-06 RX ORDER — ONDANSETRON 2 MG/ML
4 INJECTION INTRAMUSCULAR; INTRAVENOUS ONCE
Status: DISCONTINUED | OUTPATIENT
Start: 2020-01-06 | End: 2020-01-06

## 2020-01-06 RX ORDER — MORPHINE SULFATE 4 MG/ML
4 INJECTION, SOLUTION INTRAMUSCULAR; INTRAVENOUS ONCE
Status: COMPLETED | OUTPATIENT
Start: 2020-01-06 | End: 2020-01-06

## 2020-01-06 RX ORDER — KETOROLAC TROMETHAMINE 30 MG/ML
15 INJECTION, SOLUTION INTRAMUSCULAR; INTRAVENOUS ONCE
Status: DISCONTINUED | OUTPATIENT
Start: 2020-01-06 | End: 2020-01-06

## 2020-01-06 RX ORDER — SODIUM CHLORIDE 9 MG/ML
1000 INJECTION, SOLUTION INTRAVENOUS ONCE
Status: DISCONTINUED | OUTPATIENT
Start: 2020-01-06 | End: 2020-01-06

## 2020-01-06 RX ORDER — HYDROCODONE BITARTRATE AND ACETAMINOPHEN 5; 325 MG/1; MG/1
1-2 TABLET ORAL EVERY 6 HOURS PRN
Qty: 10 TABLET | Refills: 0 | Status: SHIPPED | OUTPATIENT
Start: 2020-01-06 | End: 2020-01-13

## 2020-01-06 RX ORDER — ONDANSETRON 4 MG/1
4 TABLET, ORALLY DISINTEGRATING ORAL EVERY 4 HOURS PRN
Qty: 10 TABLET | Refills: 0 | Status: SHIPPED | OUTPATIENT
Start: 2020-01-06 | End: 2020-01-13

## 2020-01-06 NOTE — ED INITIAL ASSESSMENT (HPI)
C/o nausea vomiting and diarrhea multiple times started early this morning  Unable to keep anything down

## 2020-01-06 NOTE — ED PROVIDER NOTES
Patient Seen in: [facility] Immediate Care In [address] [place] [address]    History   Patient presents with:  Nausea/Vomiting/Diarrhea    Stated Complaint: vomiting    HPI    Patient complains of vomiting, this began last night, non bloody, non billious.    associat Cap,  Take 1 capsule (15 mg total) by mouth 2 (two) times daily. Cholecalciferol (VITAMIN D3) 90379 units Oral Cap,  Take 1 capsule by mouth once a week.  With food for 12 weeks total, then begin OTC Vitamin D 2000 units daily thereafter   Minocycline HCl HPI.  Constitutional and vital signs reviewed. All other systems reviewed and negative except as noted above. PSFH elements reviewed from today and agreed except as otherwise stated in HPI.     Physical Exam     ED Triage Vitals [01/06/20 1227]   BP not specified, unspecified vomiting type  (primary encounter diagnosis)    Disposition:  Discharge    Follow-up:  Meryle Raveling, [address] [facility] [address]  [phone]            Medications Prescribed:  Discharge Medication List as

## 2020-01-07 ENCOUNTER — PATIENT MESSAGE (OUTPATIENT)
Dept: INTERNAL MEDICINE CLINIC | Facility: CLINIC | Age: 25
End: 2020-01-07

## 2020-01-07 NOTE — TELEPHONE ENCOUNTER
From: Hamilton Valles  To: Milo Peraza MD  Sent: 1/7/2020 9:29 AM CST  Subject: Test Results Question    Hi Dr. Dewayne Truong,    I went to the emergency room last night due to vomiting and diarrhea.  They did a CT scan and found I have very small spot on

## 2020-01-07 NOTE — ED PROVIDER NOTES
Patient Seen in: [facility] Emergency Department      History   Patient presents with:  Nausea/Vomiting/Diarrhea    Stated Complaint: n/v x 1 day    23yo. f with hx of anxiety, fibromylagia, insomnia reports to the ED with abdominal pain radiating Alcohol/week: 0.0 standard drinks      Comment: SOCIALLY     Drug use: No             Review of Systems   All other systems reviewed and are negative. Positive for stated complaint: n/v x 1 day  Other systems are as noted in HPI.   Constitutional and v components:       Result Value    Ketones Urine 20  (*)     Protein Urine 30  (*)     Bacteria Urine Few (*)     All other components within normal limits   BASIC METABOLIC PANEL (8) - Abnormal; Notable for the following components:    Glucose 113 (*) mass.   ABDOMINAL WALL:      Tiny fat containing umbilical hernia. URINARY BLADDER:    Normal.   ASCITES:          None. PELVIC ORGANS:         No suspect pelvic mass. BONES:             No suspect bone lesion.   LUNG BASES:  Normal.  No visible pulmonar

## 2020-01-13 RX ORDER — METHOCARBAMOL 750 MG/1
TABLET ORAL
Qty: 12 CAPSULE | Refills: 0 | OUTPATIENT
Start: 2020-01-13

## 2020-01-13 NOTE — TELEPHONE ENCOUNTER
Requesting Vitamin D  LOV: 11/6/19  RTC: not noted  Last Relevant Labs: 10/12/19  Filled: 10/16/19 #12 with 0 refills    Future Appointments   Date Time Provider Ana Sweeney   1/20/2020  4:40 PM Omar Melvin MD [facility]     Notes recorded b

## 2020-01-20 ENCOUNTER — OFFICE VISIT (OUTPATIENT)
Dept: INTERNAL MEDICINE CLINIC | Facility: CLINIC | Age: 25
End: 2020-01-20
Payer: COMMERCIAL

## 2020-01-20 VITALS
HEART RATE: 80 BPM | WEIGHT: 163 LBS | BODY MASS INDEX: 27.83 KG/M2 | HEIGHT: 64 IN | SYSTOLIC BLOOD PRESSURE: 120 MMHG | RESPIRATION RATE: 16 BRPM | DIASTOLIC BLOOD PRESSURE: 70 MMHG

## 2020-01-20 DIAGNOSIS — Z51.81 THERAPEUTIC DRUG MONITORING: Primary | ICD-10-CM

## 2020-01-20 DIAGNOSIS — A08.4 VIRAL GASTROENTERITIS: ICD-10-CM

## 2020-01-20 DIAGNOSIS — E66.09 OBESITY DUE TO EXCESS CALORIES WITHOUT SERIOUS COMORBIDITY, UNSPECIFIED CLASSIFICATION: ICD-10-CM

## 2020-01-20 PROCEDURE — 99214 OFFICE O/P EST MOD 30 MIN: CPT | Performed by: INTERNAL MEDICINE

## 2020-01-20 RX ORDER — PHENTERMINE HYDROCHLORIDE 37.5 MG/1
37.5 TABLET ORAL
Qty: 30 TABLET | Refills: 1 | Status: SHIPPED | OUTPATIENT
Start: 2020-01-20 | End: 2020-03-05

## 2020-01-20 RX ORDER — PHENTERMINE HYDROCHLORIDE 15 MG/1
15 CAPSULE ORAL 2 TIMES DAILY
Qty: 60 CAPSULE | Refills: 1 | Status: CANCELLED | OUTPATIENT
Start: 2020-01-20

## 2020-01-20 NOTE — PROGRESS NOTES
HISTORY OF PRESENT ILLNESS  Patient presents with:  Weight Check: down 5 lbs      Sally New is a 21year old female here for follow up in medical weight loss program.     Denies chest pain, shortness of breath, dizziness, blurred vision, headache, pa (H) 01/06/2020    CREATSERUM 0.73 01/06/2020    ANIONGAP 7 01/06/2020    GFRNAA 115 01/06/2020    GFRAA 132 01/06/2020    CA 9.2 01/06/2020    OSMOCALC 294 01/06/2020    ALKPHO 76 07/06/2019    AST 17 07/06/2019    ALT 22 07/06/2019    BILT 0.3 07/06/2019 monitoring    Obesity due to excess calories without serious comorbidity, unspecified classification    Viral gastroenteritis    Other orders  -     Cancel: Phentermine HCl 15 MG Oral Cap; Take 1 capsule (15 mg total) by mouth 2 (two) times daily.   -     P

## 2020-01-24 ENCOUNTER — OFFICE VISIT (OUTPATIENT)
Dept: INTERNAL MEDICINE CLINIC | Facility: HOSPITAL | Age: 25
End: 2020-01-24
Attending: EMERGENCY MEDICINE

## 2020-01-24 DIAGNOSIS — Z00.00 WELLNESS EXAMINATION: Primary | ICD-10-CM

## 2020-01-24 PROCEDURE — 86480 TB TEST CELL IMMUN MEASURE: CPT

## 2020-01-27 LAB
M TB IFN-G CD4+ T-CELLS BLD-ACNC: 0.03 IU/ML
M TB TUBERC IFN-G BLD QL: NEGATIVE
M TB TUBERC IGNF/MITOGEN IGNF CONTROL: >10 IU/ML
QUANTIFERON TB1 MINUS NIL: 0 IU/ML
QUANTIFERON TB2 MINUS NIL: 0 IU/ML

## 2020-02-21 ENCOUNTER — PATIENT MESSAGE (OUTPATIENT)
Dept: INTERNAL MEDICINE CLINIC | Facility: CLINIC | Age: 25
End: 2020-02-21

## 2020-02-21 NOTE — TELEPHONE ENCOUNTER
From: Tha Duron  To:  Renea Burks MD  Sent: 2/21/2020 9:50 AM CST  Subject: Prescription Question    Hi Dr. Nasreen Groves,    I went and picked up my Phentermine yesterday and the pharmacist mentioned that there is a known drug interaction between phentermine a

## 2020-03-03 ENCOUNTER — TELEPHONE (OUTPATIENT)
Dept: INTERNAL MEDICINE CLINIC | Facility: CLINIC | Age: 25
End: 2020-03-03

## 2020-03-03 ENCOUNTER — PATIENT MESSAGE (OUTPATIENT)
Dept: INTERNAL MEDICINE CLINIC | Facility: CLINIC | Age: 25
End: 2020-03-03

## 2020-03-03 NOTE — TELEPHONE ENCOUNTER
From: Nataliya Steel  To: Imelda Gimenez MD  Sent: 3/3/2020 12:36 PM CST  Subject: Non-Urgent Medical Question    Hi,    I would like to try and wean off of the Venlafaxine. Is there a better way to do it rather than cutting the dose in half?  When we c

## 2020-03-03 NOTE — TELEPHONE ENCOUNTER
From: Joseline Menendez  To: Nevaeh Carvajal MD  Sent: 3/3/2020 12:36 PM CST  Subject: Non-Urgent Medical Question    Hi,    I would like to try and wean off of the Venlafaxine. Is there a better way to do it rather than cutting the dose in half?  When we c

## 2020-03-03 NOTE — TELEPHONE ENCOUNTER
To Dr. Bruce Holbrook to advise on Venlafaxine/CT results (my chart question from 2/25/20). Sent my chart to patient advising that MD not in office today and to wait for recommendations before changing medication.

## 2020-03-03 NOTE — TELEPHONE ENCOUNTER
Copied in TT and routed to MD.     My chart sent to patient: [facility]tazar Artist,    Dr. Saji Diego is not in office today but will be back tomorrow. I will pass on your question about weaning off the Venlafaxine as well as your CT results to him.  Please do not make

## 2020-03-05 NOTE — TELEPHONE ENCOUNTER
Many options to discuss, including changing venlafaxine to alternative; advise see me to discuss; please call pt

## 2020-03-05 NOTE — PROGRESS NOTES
Neo Gutierrez is a 21year old female.     HPI:   Patient presents with:  Medication Follow-Up: Patient present to discuss Venlafexine tapering and discuss CT      22 y/o F with anxiety, fibromyalgia, insomnia here for F/U; on venlafaxine  mg po qD Other    • Skin cancer Other    • Dementia Other         ALZHEIMERS DISEASE   • Diabetes Other    • Breast Cancer Paternal Aunt 54   • Cancer Paternal Uncle         TESTICULAR CANCER      Social History: Social History    Tobacco Use      Smoking status: N 97.9 °F (36.6 °C), temperature source Oral, resp. rate 16, height 5' 4\" (1.626 m), weight 165 lb (74.8 kg), SpO2 99 %, not currently breastfeeding.   Constitutional: alert and oriented x3 in no acute distress  HEENT- EOMI, PERRL  Nose/Mouth/Throat: pharynx amitriptyline 10 mg qHS; on Metamucil 1 tbsp qD; resume Miralax 17 gm po BID; add Colace 100 mg po BID, Senokot one tab po BID, MOM 1 tbsp po qD prn, and mineral oil 1 tbsp qD    GERD  May take Protonix 40 mg qD prn; hold Protonix for now due to constipati

## 2020-03-08 ENCOUNTER — PATIENT MESSAGE (OUTPATIENT)
Dept: INTERNAL MEDICINE CLINIC | Facility: CLINIC | Age: 25
End: 2020-03-08

## 2020-03-09 RX ORDER — VENLAFAXINE HYDROCHLORIDE 37.5 MG/1
37.5 CAPSULE, EXTENDED RELEASE ORAL DAILY
Qty: 30 CAPSULE | Refills: 5 | Status: SHIPPED | OUTPATIENT
Start: 2020-03-09 | End: 2020-03-25

## 2020-03-09 RX ORDER — VENLAFAXINE HYDROCHLORIDE 75 MG/1
75 CAPSULE, EXTENDED RELEASE ORAL DAILY
Qty: 30 CAPSULE | Refills: 5 | Status: SHIPPED | OUTPATIENT
Start: 2020-03-09 | End: 2020-03-25

## 2020-03-09 NOTE — TELEPHONE ENCOUNTER
From: Derek Akins  To:  Karo Tai MD  Sent: 3/8/2020 5:08 PM CDT  Subject: Prescription Question    Hi Dr. Hernandez Heads,    I went to  the Venlafaxine and the pharmacist told me that the capsules are much cheaper than the tablets (close to $2

## 2020-03-09 NOTE — TELEPHONE ENCOUNTER
Requested Prescriptions     Signed Prescriptions Disp Refills   • Venlafaxine HCl ER 75 MG Oral Capsule SR 24 Hr 30 capsule 5     Sig: Take 1 capsule (75 mg total) by mouth daily. Authorizing Provider:  Braydon Lucas     Ordering User: Kendall Ron

## 2020-03-24 ENCOUNTER — TELEPHONE (OUTPATIENT)
Dept: INTERNAL MEDICINE CLINIC | Facility: CLINIC | Age: 25
End: 2020-03-24

## 2020-03-24 ENCOUNTER — PATIENT MESSAGE (OUTPATIENT)
Dept: INTERNAL MEDICINE CLINIC | Facility: CLINIC | Age: 25
End: 2020-03-24

## 2020-03-24 NOTE — TELEPHONE ENCOUNTER
Dee Dee Velazquez   to Gracy Noriega MD            3/24/20 8:07 AM   Hi Dr Galina Taylor,     I am taking the 112mg of Venlafaxine and am starting to feel the withdrawal symptoms.  I've been taking it for about two weeks now, and am noticing it more and more

## 2020-03-24 NOTE — TELEPHONE ENCOUNTER
From: Lu Perdomo  To: Jeni Tai MD  Sent: 3/24/2020 8:07 AM CDT  Subject: Prescription Question    Hi Dr Christopher Le,    I am taking the 112mg of Venlafaxine and am starting to feel the withdrawal symptoms.  I've been taking it for about two weeks

## 2020-03-25 RX ORDER — FLUOXETINE 20 MG/1
40 TABLET, FILM COATED ORAL DAILY
Qty: 150 TABLET | Refills: 0 | Status: SHIPPED | OUTPATIENT
Start: 2020-03-25 | End: 2020-07-23

## 2020-03-25 NOTE — TELEPHONE ENCOUNTER
Change venlafaxine to fluoxetin 40 mg po qD x 4 weeks, 30 mg po qD x 4 weeks, then 20 mg po qd x 4 weeks, then 10 mg po qD x 4 weeks; ERx sent; pt called

## 2020-03-29 ENCOUNTER — PATIENT MESSAGE (OUTPATIENT)
Dept: INTERNAL MEDICINE CLINIC | Facility: CLINIC | Age: 25
End: 2020-03-29

## 2020-04-13 ENCOUNTER — TELEPHONE (OUTPATIENT)
Dept: INTERNAL MEDICINE CLINIC | Facility: CLINIC | Age: 25
End: 2020-04-13

## 2020-04-13 RX ORDER — LORAZEPAM 0.5 MG/1
0.5 TABLET ORAL 2 TIMES DAILY PRN
Qty: 60 TABLET | Refills: 3 | Status: SHIPPED | OUTPATIENT
Start: 2020-04-13 | End: 2021-01-08

## 2020-04-13 NOTE — TELEPHONE ENCOUNTER
To MD:  The above refill request is for a controlled substance. Please review pended medication order. Print and sign for staff to fax to pharmacy or prescribe electronically. To DR. QUIROGA

## 2020-04-21 ENCOUNTER — VIRTUAL PHONE E/M (OUTPATIENT)
Dept: INTERNAL MEDICINE CLINIC | Facility: CLINIC | Age: 25
End: 2020-04-21
Payer: COMMERCIAL

## 2020-04-21 DIAGNOSIS — E66.09 OBESITY DUE TO EXCESS CALORIES WITHOUT SERIOUS COMORBIDITY, UNSPECIFIED CLASSIFICATION: ICD-10-CM

## 2020-04-21 DIAGNOSIS — Z51.81 THERAPEUTIC DRUG MONITORING: Primary | ICD-10-CM

## 2020-04-21 PROCEDURE — 99213 OFFICE O/P EST LOW 20 MIN: CPT | Performed by: INTERNAL MEDICINE

## 2020-04-21 RX ORDER — DIETHYLPROPION HYDROCHLORIDE 75 MG/1
1 TABLET ORAL EVERY MORNING
Qty: 30 TABLET | Refills: 0 | Status: SHIPPED | OUTPATIENT
Start: 2020-04-21 | End: 2020-05-21

## 2020-04-21 NOTE — PROGRESS NOTES
[facility] Weight Management check in/follow up encounter  Virtual/Telephone Check-In    John Hobson verbally consents to a Virtual/Telephone Check-In service on 04/21/20      Patient understands and accepts financial responsibility for any deduct

## 2020-05-18 ENCOUNTER — TELEPHONE (OUTPATIENT)
Dept: ADMINISTRATIVE | Facility: HOSPITAL | Age: 25
End: 2020-05-18

## 2020-05-18 DIAGNOSIS — Z20.822 CLOSE EXPOSURE TO COVID-19 VIRUS: Primary | ICD-10-CM

## 2020-05-18 NOTE — TELEPHONE ENCOUNTER
Called Covid hotline- Reports exposure to +Covid/close contact at party last week. Now with intermittent sore throat and \"night sweats\". Denies fever, chills, loss of taste or smell, body aches, fatigue, HA, diarrhea, sob, cough or chest discomfort.  Evy Walton

## 2020-05-19 ENCOUNTER — LAB ENCOUNTER (OUTPATIENT)
Dept: LAB | Facility: HOSPITAL | Age: 25
End: 2020-05-19
Attending: NURSE PRACTITIONER
Payer: COMMERCIAL

## 2020-05-19 DIAGNOSIS — Z20.822 CLOSE EXPOSURE TO COVID-19 VIRUS: ICD-10-CM

## 2020-05-26 ENCOUNTER — VIRTUAL PHONE E/M (OUTPATIENT)
Dept: INTERNAL MEDICINE CLINIC | Facility: CLINIC | Age: 25
End: 2020-05-26

## 2020-05-26 VITALS — WEIGHT: 167 LBS | BODY MASS INDEX: 28 KG/M2

## 2020-05-26 DIAGNOSIS — M79.7 FIBROMYALGIA: ICD-10-CM

## 2020-05-26 DIAGNOSIS — E55.9 VITAMIN D DEFICIENCY: ICD-10-CM

## 2020-05-26 DIAGNOSIS — F41.9 ANXIETY: ICD-10-CM

## 2020-05-26 DIAGNOSIS — Z51.81 THERAPEUTIC DRUG MONITORING: Primary | ICD-10-CM

## 2020-05-26 DIAGNOSIS — E53.8 B12 DEFICIENCY: ICD-10-CM

## 2020-05-26 PROCEDURE — 99213 OFFICE O/P EST LOW 20 MIN: CPT | Performed by: NURSE PRACTITIONER

## 2020-05-26 RX ORDER — PHENTERMINE HYDROCHLORIDE 37.5 MG/1
37.5 TABLET ORAL
Qty: 30 TABLET | Refills: 0 | Status: SHIPPED | OUTPATIENT
Start: 2020-05-26 | End: 2020-10-27

## 2020-05-26 NOTE — PATIENT INSTRUCTIONS
We are here to support you with weight loss, but please remember that you still need your primary care provider for your routine health maintenance.       PLAN:  Will stop diethylopropion and restart phentermine 37.5 mg   Follow up with me in 1 month  Sched ** Daily INPUT> Look at nutrition section-- \"nutrients\" and it will break down your macros for the day (ie. Protein, carbs, fibers, sugars and fats). Try to stay within these numbers daily     2.  \"7 minute workout\" to help with exercise/a

## 2020-05-26 NOTE — PROGRESS NOTES
Virtual Telephone Check-In    Nataliya Steel verbally consents to a Virtual/Telephone Check-In visit on 5/26/2020. Patient understands and accepts financial responsibility for any deductible, co-insurance and/or co-pays associated with this service. changes    Physical Exam:  Appropriate affect and mood, normal logic and thought process   Patient speaking in full sentences, no increased work of breathing    Assessment/Plan:   Diagnoses and all orders for this visit:    Therapeutic drug monitoring  BMI decision-making and tests are ordered as detailed in the plan of care above.      Ernie Gutierrez, APRN

## 2020-06-17 RX ORDER — FLUOXETINE 20 MG/1
TABLET, FILM COATED ORAL
Qty: 150 TABLET | Refills: 0 | OUTPATIENT
Start: 2020-06-17

## 2020-06-17 NOTE — TELEPHONE ENCOUNTER
Refill request has failed the Ambulatory Medication Refill Standing Order and is routed to the primary physician to review the following:    Requested Prescriptions     Refused Prescriptions Disp Refills   • FLUOXETINE HCL 20 MG Oral Tab [Pharmacy Med Name

## 2020-06-30 RX ORDER — GABAPENTIN 300 MG/1
CAPSULE ORAL
Qty: 180 CAPSULE | Refills: 3 | Status: SHIPPED | OUTPATIENT
Start: 2020-06-30 | End: 2021-04-29

## 2020-07-24 ENCOUNTER — TELEPHONE (OUTPATIENT)
Dept: INTERNAL MEDICINE CLINIC | Facility: CLINIC | Age: 25
End: 2020-07-24

## 2020-07-24 NOTE — TELEPHONE ENCOUNTER
Pt needs note from physician for employer regarding:  Trazadone  Lorazepam     Pt will fax form and will  when ready  Please call pt when this is available  Pt hoping to  today as Dr Anila Sandoval will be out next week    Tasked to nursing

## 2020-07-31 ENCOUNTER — TELEPHONE (OUTPATIENT)
Dept: INTERNAL MEDICINE CLINIC | Facility: HOSPITAL | Age: 25
End: 2020-07-31

## 2020-07-31 DIAGNOSIS — Z78.9 PARTICIPANT IN HEALTH AND WELLNESS PLAN: Primary | ICD-10-CM

## 2020-08-03 RX ORDER — TRAZODONE HYDROCHLORIDE 50 MG/1
TABLET ORAL
Qty: 90 TABLET | Refills: 3 | Status: SHIPPED | OUTPATIENT
Start: 2020-08-03

## 2020-08-20 ENCOUNTER — NURSE ONLY (OUTPATIENT)
Dept: LAB | Age: 25
End: 2020-08-20
Attending: PREVENTIVE MEDICINE

## 2020-08-20 DIAGNOSIS — Z78.9 PARTICIPANT IN HEALTH AND WELLNESS PLAN: ICD-10-CM

## 2020-08-20 LAB — SARS-COV-2 IGG SERPLBLD QL IA.RAPID: NEGATIVE

## 2020-08-20 PROCEDURE — 86769 SARS-COV-2 COVID-19 ANTIBODY: CPT

## 2020-09-27 ENCOUNTER — HOSPITAL ENCOUNTER (OUTPATIENT)
Age: 25
Discharge: HOME | End: 2020-09-27
Attending: EMERGENCY MEDICINE
Payer: COMMERCIAL

## 2020-09-27 VITALS
BODY MASS INDEX: 29.88 KG/M2 | HEART RATE: 84 BPM | HEIGHT: 64 IN | DIASTOLIC BLOOD PRESSURE: 80 MMHG | TEMPERATURE: 98 F | OXYGEN SATURATION: 99 % | SYSTOLIC BLOOD PRESSURE: 141 MMHG | RESPIRATION RATE: 18 BRPM | WEIGHT: 175 LBS

## 2020-09-27 DIAGNOSIS — J02.9 SORE THROAT: Primary | ICD-10-CM

## 2020-09-27 DIAGNOSIS — J02.0 STREP PHARYNGITIS: ICD-10-CM

## 2020-09-27 DIAGNOSIS — Z20.822 ENCOUNTER FOR LABORATORY TESTING FOR COVID-19 VIRUS: ICD-10-CM

## 2020-09-27 LAB — S PYO AG THROAT QL: POSITIVE

## 2020-09-27 PROCEDURE — 87880 STREP A ASSAY W/OPTIC: CPT | Performed by: EMERGENCY MEDICINE

## 2020-09-27 PROCEDURE — 99213 OFFICE O/P EST LOW 20 MIN: CPT | Performed by: EMERGENCY MEDICINE

## 2020-09-27 PROCEDURE — U0003 INFECTIOUS AGENT DETECTION BY NUCLEIC ACID (DNA OR RNA); SEVERE ACUTE RESPIRATORY SYNDROME CORONAVIRUS 2 (SARS-COV-2) (CORONAVIRUS DISEASE [COVID-19]), AMPLIFIED PROBE TECHNIQUE, MAKING USE OF HIGH THROUGHPUT TECHNOLOGIES AS DESCRIBED BY CMS-2020-01-R: HCPCS | Performed by: EMERGENCY MEDICINE

## 2020-09-27 RX ORDER — AMOXICILLIN 500 MG/1
500 TABLET, FILM COATED ORAL 3 TIMES DAILY
Qty: 30 TABLET | Refills: 0 | Status: SHIPPED | OUTPATIENT
Start: 2020-09-27 | End: 2020-10-07

## 2020-09-27 RX ORDER — SERTRALINE HYDROCHLORIDE 100 MG/1
100 TABLET, FILM COATED ORAL DAILY
COMMUNITY
Start: 2020-08-31 | End: 2020-10-27

## 2020-09-27 NOTE — ED PROVIDER NOTES
Patient Seen in: [facility] Immediate Care In [place]      History   Patient presents with:  Sore Throat    Stated Complaint: sore throat; ear pain    HPI    Patient is a 56-year-old female presents to immediate care complaining of a sore throat No household smokers. Alcohol use: Yes      Alcohol/week: 0.0 standard drinks      Frequency: 2-4 times a month      Drinks per session: 1 or 2      Binge frequency: Never      Comment: SOCIALLY     Drug use:  No             Review of Systems   Constitu Pulmonary:      Effort: Pulmonary effort is normal. No respiratory distress. Breath sounds: Normal breath sounds. No wheezing or rhonchi. Abdominal:      General: Bowel sounds are normal.      Palpations: Abdomen is soft.    Lymphadenopathy:      C

## 2020-09-28 ENCOUNTER — EMPLOYEE HEALTH (OUTPATIENT)
Dept: OTHER | Age: 25
End: 2020-09-28

## 2020-09-28 DIAGNOSIS — Z20.822 SUSPECTED COVID-19 VIRUS INFECTION: Primary | ICD-10-CM

## 2020-09-28 LAB — SARS-COV-2 RNA,QUAL, RT-PCR: NOT DETECTED

## 2020-09-29 ENCOUNTER — EMPLOYEE HEALTH (OUTPATIENT)
Dept: OTHER | Age: 25
End: 2020-09-29

## 2020-09-30 ENCOUNTER — TELEPHONE (OUTPATIENT)
Dept: INTERNAL MEDICINE CLINIC | Facility: CLINIC | Age: 25
End: 2020-09-30

## 2020-09-30 RX ORDER — CODEINE PHOSPHATE AND GUAIFENESIN 10; 100 MG/5ML; MG/5ML
10 SOLUTION ORAL EVERY 6 HOURS PRN
Qty: 180 ML | Refills: 1 | Status: SHIPPED | OUTPATIENT
Start: 2020-09-30 | End: 2020-10-22

## 2020-09-30 NOTE — TELEPHONE ENCOUNTER
Please call pt  Jeremy Kumar to immediate care on Sunday, tested negative for COVID, positive for strep  Pt is progressively feeling worse, has been on amoxicillin since Sunday  Pt now has pink eye also  Should pt be seen in office?   Tasked to nursing

## 2020-10-01 NOTE — TELEPHONE ENCOUNTER
has pink eye; pt now has pink eye; pt has +cough; on amoxicillin for strep throat; mild wheezing    Imp- viral bronchitis; strep throat    Rec- continue amoxicillin; add Robitussin AC 10 mL po q4hrs prn,     Pt called

## 2020-10-02 RX ORDER — GENTAMICIN SULFATE 3 MG/ML
2 SOLUTION/ DROPS OPHTHALMIC 4 TIMES DAILY
Qty: 5 ML | Refills: 1 | Status: SHIPPED | OUTPATIENT
Start: 2020-10-02 | End: 2020-10-05

## 2020-10-02 RX ORDER — AZITHROMYCIN 250 MG/1
TABLET, FILM COATED ORAL
Qty: 6 TABLET | Refills: 1 | Status: SHIPPED | OUTPATIENT
Start: 2020-10-02 | End: 2020-10-27

## 2020-10-02 NOTE — TELEPHONE ENCOUNTER
Had covid test - results were negative  Still not feeling well  On amoxicillin since Sunday/still coughing  Cough med w/codeine helps but makes her sleepy   Also has pink eye - using med her  received from [address] [place] [address]    Concerned about going back to work /

## 2020-10-02 NOTE — TELEPHONE ENCOUNTER
On-call telephone call to patient and situation discussed. Patient was seen in urgent care last week with a sore throat and found to have strep.   Patient was placed on amoxicillin but now has developed a problem with cough and chest congestion and also ha

## 2020-10-05 NOTE — TELEPHONE ENCOUNTER
I spoke with patient. She is feeling better. She is still on the Z pack. She has 2 more days. She takes a cough medicine at night time. No fever. She would like to return to work tomorrow. She will need a note.  Dr. Heather Huston, please advise if you need to se

## 2020-10-05 NOTE — TELEPHONE ENCOUNTER
Patient is calling to get a note to return to work. Patient has been out of work for a week due to being sick. Patient is now feeling better and would like to return to work.      Best call back: [phone]

## 2020-10-10 ENCOUNTER — LAB ENCOUNTER (OUTPATIENT)
Dept: LAB | Age: 25
End: 2020-10-10
Attending: NURSE PRACTITIONER
Payer: COMMERCIAL

## 2020-10-10 DIAGNOSIS — E55.9 AVITAMINOSIS D: ICD-10-CM

## 2020-10-10 DIAGNOSIS — Z51.81 ENCOUNTER FOR THERAPEUTIC DRUG MONITORING: ICD-10-CM

## 2020-10-10 DIAGNOSIS — R53.83 FATIGUE: Primary | ICD-10-CM

## 2020-10-10 DIAGNOSIS — Z87.312 PERSONAL HISTORY OF STRESS FRACTURE: ICD-10-CM

## 2020-10-10 PROCEDURE — 84443 ASSAY THYROID STIM HORMONE: CPT

## 2020-10-10 PROCEDURE — 85025 COMPLETE CBC W/AUTO DIFF WBC: CPT

## 2020-10-10 PROCEDURE — 82465 ASSAY BLD/SERUM CHOLESTEROL: CPT

## 2020-10-10 PROCEDURE — 36415 COLL VENOUS BLD VENIPUNCTURE: CPT

## 2020-10-10 PROCEDURE — 80053 COMPREHEN METABOLIC PANEL: CPT

## 2020-10-10 PROCEDURE — 82306 VITAMIN D 25 HYDROXY: CPT

## 2020-10-21 ENCOUNTER — PATIENT MESSAGE (OUTPATIENT)
Dept: INTERNAL MEDICINE CLINIC | Facility: CLINIC | Age: 25
End: 2020-10-21

## 2020-10-22 RX ORDER — CODEINE PHOSPHATE AND GUAIFENESIN 10; 100 MG/5ML; MG/5ML
10 SOLUTION ORAL EVERY 6 HOURS PRN
Qty: 180 ML | Refills: 1 | Status: SHIPPED | OUTPATIENT
Start: 2020-10-22 | End: 2020-10-27

## 2020-10-22 RX ORDER — BUDESONIDE AND FORMOTEROL FUMARATE DIHYDRATE 160; 4.5 UG/1; UG/1
2 AEROSOL RESPIRATORY (INHALATION) 2 TIMES DAILY
Qty: 1 INHALER | Refills: 3 | Status: SHIPPED | OUTPATIENT
Start: 2020-10-22 | End: 2021-04-29 | Stop reason: ALTCHOICE

## 2020-10-22 NOTE — TELEPHONE ENCOUNTER
RN TO Drew Alvarez TO MYAH    Regarding: Prescription Question  Contact: [phone]     ----- Message from Yunior Barton to Timi Willis MD sent at 10/21/2020  7:37 PM -----   Hi Dr. Bev Alonzo,    I had taken the z pack that was prescribed to

## 2020-10-22 NOTE — TELEPHONE ENCOUNTER
Routed to Clinical Pool for Nursing Triage in [facility] copy/pasted below:    From: Celeste Hernandez  To:  Yefri Aguilar MD  Sent: 10/21/2020  7:37 PM CDT  Subject: Prescription Question    Hi Dr. See Willis,    I had taken the z pack that was presc

## 2020-10-22 NOTE — TELEPHONE ENCOUNTER
To Dr. Omar Marinelli to please advise---  Spoke to patient who reports persistent cough with \"cough spurts\" that cause patient to have difficulty catching her breath when she is experiencing one of these coughing spurts. Pt denies SOB, +wheezing when coughing.  Pt ta

## 2020-10-23 NOTE — TELEPHONE ENCOUNTER
Imp- asthma/ reactive airway disease/ tracheobronchitis    Rec- Symbicort 160/4.5 2 p BID, and refill Cheratussin AC 10 ml po q4hrs prn, #180 mL, 1 RF    Pt called; ERx sent

## 2020-10-27 ENCOUNTER — OFFICE VISIT (OUTPATIENT)
Dept: INTERNAL MEDICINE CLINIC | Facility: CLINIC | Age: 25
End: 2020-10-27
Payer: COMMERCIAL

## 2020-10-27 VITALS
WEIGHT: 182 LBS | HEIGHT: 64 IN | DIASTOLIC BLOOD PRESSURE: 68 MMHG | BODY MASS INDEX: 31.07 KG/M2 | RESPIRATION RATE: 16 BRPM | HEART RATE: 80 BPM | SYSTOLIC BLOOD PRESSURE: 112 MMHG

## 2020-10-27 DIAGNOSIS — G47.09 OTHER INSOMNIA: ICD-10-CM

## 2020-10-27 DIAGNOSIS — F41.9 ANXIETY: ICD-10-CM

## 2020-10-27 DIAGNOSIS — Z51.81 THERAPEUTIC DRUG MONITORING: Primary | ICD-10-CM

## 2020-10-27 DIAGNOSIS — R63.5 WEIGHT GAIN: ICD-10-CM

## 2020-10-27 PROCEDURE — 3078F DIAST BP <80 MM HG: CPT | Performed by: INTERNAL MEDICINE

## 2020-10-27 PROCEDURE — 99214 OFFICE O/P EST MOD 30 MIN: CPT | Performed by: INTERNAL MEDICINE

## 2020-10-27 PROCEDURE — 3074F SYST BP LT 130 MM HG: CPT | Performed by: INTERNAL MEDICINE

## 2020-10-27 PROCEDURE — 3008F BODY MASS INDEX DOCD: CPT | Performed by: INTERNAL MEDICINE

## 2020-10-27 RX ORDER — NALTREXONE HYDROCHLORIDE AND BUPROPION HYDROCHLORIDE 8; 90 MG/1; MG/1
2 TABLET, EXTENDED RELEASE ORAL 2 TIMES DAILY
Qty: 120 TABLET | Refills: 1 | Status: SHIPPED | OUTPATIENT
Start: 2020-10-27 | End: 2020-11-26

## 2020-10-27 RX ORDER — ESCITALOPRAM OXALATE 5 MG/1
TABLET ORAL
COMMUNITY
Start: 2020-09-26 | End: 2020-12-29

## 2020-10-27 NOTE — PROGRESS NOTES
HISTORY OF PRESENT ILLNESS  Patient presents with:  Weight Check: 19 pound weight gain      Dana Solis is a 31year old female here for follow up in medical weight loss program.     Denies chest pain, shortness of breath, dizziness, blurred vision, he 10/10/2020    CA 9.4 10/10/2020    OSMOCALC 289 10/10/2020    ALKPHO 69 10/10/2020    AST 17 10/10/2020    ALT 21 10/10/2020    BILT 0.2 10/10/2020    TP 7.6 10/10/2020    ALB 3.5 10/10/2020    GLOBULIN 4.1 10/10/2020    AGRATIO 1.6 01/28/2014     10 · Understandable high stress with COVID   · Working on sleep study and getting off medication for sleep   · Start contrave   · -advised of side effects and adverse effects of this medication  · Reviewed the role of contrave and anxiety and will monitor c

## 2020-10-29 ENCOUNTER — TELEPHONE (OUTPATIENT)
Dept: INTERNAL MEDICINE CLINIC | Facility: CLINIC | Age: 25
End: 2020-10-29

## 2020-10-29 ENCOUNTER — PATIENT MESSAGE (OUTPATIENT)
Dept: INTERNAL MEDICINE CLINIC | Facility: CLINIC | Age: 25
End: 2020-10-29

## 2020-10-29 RX ORDER — DOXYCYCLINE HYCLATE 100 MG/1
100 CAPSULE ORAL 2 TIMES DAILY
Qty: 20 CAPSULE | Refills: 0 | Status: SHIPPED | OUTPATIENT
Start: 2020-10-29 | End: 2021-04-29 | Stop reason: ALTCHOICE

## 2020-10-29 NOTE — TELEPHONE ENCOUNTER
+cough; +sputum; +post-nasal drip; s/p Zithromax (Z-santana) as directed x5d from 10/2-10/6;     Reports worsening cough x 3 days; no F/C; no SOB    Imp- URI;     Rec- doxycycline 100 mg po BID #20; Erx sent; pt called

## 2020-10-29 NOTE — TELEPHONE ENCOUNTER
From: Moose [place]  To: Halley Noriega MD  Sent: 10/29/2020 11:42 AM CDT  Subject: Visit Follow-up Question    Hi Dr. Terri Esquivel,    I'm feeling worse over the last few days.  My cough is still bad, I have green mucus that I am bringing up both through

## 2020-10-29 NOTE — TELEPHONE ENCOUNTER
From: Ranulfo Jack  To: Judd Dsouza MD  Sent: 10/29/2020 11:42 AM CDT  Subject: Visit Follow-up Question    Hi Dr. Alfonzo Closs,    I'm feeling worse over the last few days.  My cough is still bad, I have green mucus that I am bringing up both through

## 2020-10-29 NOTE — TELEPHONE ENCOUNTER
To Dr. Viridiana Colunga to please advise-----  Spoke to patient who reports +post nasal drip, no sore throat, no ear pain--ears are clogged at time. Pt reports worsening productive cough, green sputum, +night sweats, denies SOB or wheezing, denies fever.  Pt reports havin

## 2020-10-30 ENCOUNTER — EMPLOYEE HEALTH (OUTPATIENT)
Dept: OTHER | Age: 25
End: 2020-10-30

## 2020-11-01 ENCOUNTER — EMPLOYEE HEALTH (OUTPATIENT)
Dept: OTHER | Age: 25
End: 2020-11-01

## 2020-11-01 DIAGNOSIS — Z20.822 SUSPECTED COVID-19 VIRUS INFECTION: Primary | ICD-10-CM

## 2020-11-02 ENCOUNTER — LAB SERVICES (OUTPATIENT)
Dept: LAB | Age: 25
End: 2020-11-02

## 2020-11-02 DIAGNOSIS — Z20.822 SUSPECTED COVID-19 VIRUS INFECTION: ICD-10-CM

## 2020-11-02 PROCEDURE — U0003 INFECTIOUS AGENT DETECTION BY NUCLEIC ACID (DNA OR RNA); SEVERE ACUTE RESPIRATORY SYNDROME CORONAVIRUS 2 (SARS-COV-2) (CORONAVIRUS DISEASE [COVID-19]), AMPLIFIED PROBE TECHNIQUE, MAKING USE OF HIGH THROUGHPUT TECHNOLOGIES AS DESCRIBED BY CMS-2020-01-R: HCPCS | Performed by: HOSPITALIST

## 2020-11-03 ENCOUNTER — EMPLOYEE HEALTH (OUTPATIENT)
Dept: OTHER | Age: 25
End: 2020-11-03

## 2020-11-03 LAB
SARS-COV-2 RNA RESP QL NAA+PROBE: NOT DETECTED
SERVICE CMNT-IMP: NORMAL
SPECIMEN SOURCE: NORMAL

## 2020-11-05 ENCOUNTER — TELEPHONE (OUTPATIENT)
Dept: INTERNAL MEDICINE CLINIC | Facility: CLINIC | Age: 25
End: 2020-11-05

## 2020-11-05 NOTE — TELEPHONE ENCOUNTER
Veronica sent fax with concerns for patient being on trazodone and lexapro and starting contrave. It is listed in her current meds but I wanted to make sure and check with you before letting them know you are aware.

## 2020-11-07 ENCOUNTER — HOSPITAL ENCOUNTER (OUTPATIENT)
Age: 25
Discharge: HOME | End: 2020-11-07
Payer: COMMERCIAL

## 2020-11-07 ENCOUNTER — APPOINTMENT (OUTPATIENT)
Dept: GENERAL RADIOLOGY | Age: 25
End: 2020-11-07
Attending: PHYSICIAN ASSISTANT
Payer: COMMERCIAL

## 2020-11-07 VITALS
HEART RATE: 72 BPM | OXYGEN SATURATION: 98 % | DIASTOLIC BLOOD PRESSURE: 78 MMHG | TEMPERATURE: 98 F | SYSTOLIC BLOOD PRESSURE: 125 MMHG | RESPIRATION RATE: 18 BRPM

## 2020-11-07 DIAGNOSIS — R07.81 RIB PAIN: Primary | ICD-10-CM

## 2020-11-07 PROCEDURE — 71046 X-RAY EXAM CHEST 2 VIEWS: CPT | Performed by: PHYSICIAN ASSISTANT

## 2020-11-07 PROCEDURE — 71101 X-RAY EXAM UNILAT RIBS/CHEST: CPT | Performed by: PHYSICIAN ASSISTANT

## 2020-11-07 PROCEDURE — 99213 OFFICE O/P EST LOW 20 MIN: CPT

## 2020-11-07 PROCEDURE — 99214 OFFICE O/P EST MOD 30 MIN: CPT

## 2020-11-07 RX ORDER — LIDOCAINE 50 MG/G
1 PATCH TOPICAL EVERY 24 HOURS
Qty: 15 PATCH | Refills: 0 | Status: SHIPPED | OUTPATIENT
Start: 2020-11-07 | End: 2020-11-22

## 2020-11-07 RX ORDER — METAXALONE 800 MG/1
400 TABLET ORAL 3 TIMES DAILY
Qty: 15 TABLET | Refills: 0 | Status: SHIPPED | OUTPATIENT
Start: 2020-11-07 | End: 2020-11-17

## 2020-11-07 NOTE — ED INITIAL ASSESSMENT (HPI)
PATIENT ARRIVED AMBULATORY TO ROOM C/O LEFT SIDED RIB PAIN. PATIENT STATES SHE HAS HAD ISSUES WITH PAIN TO THE LEFT RIBS IN THE PAST.  PATIENT STATES \"I SAW MY CHIROPRACTOR YESTERDAY AND GOT ADJUSTED AND TODAY I FEEL WORSE\" PAIN WORSENS WITH INSPIRATION A

## 2020-11-07 NOTE — ED PROVIDER NOTES
Patient Seen in: [facility] [place]      History   Patient presents with:  Pain    Stated Complaint: pain in ribs    HPI    32 yo female here for evaluation of L side rib pain. Pt states pain has been for several weeks-months.   Goes from posterior th Alcohol/week: 0.0 standard drinks      Frequency: 2-4 times a month      Drinks per session: 1 or 2      Binge frequency: Never      Comment: SOCIALLY     Drug use:  No             Review of Systems    Positive for stated complaint: pain in ribs  Other syst nsaids, skelaxin and lidoderm patches. Close pmd follow up advised.         MDM                       Disposition and Plan     Clinical Impression:  Rib pain  (primary encounter diagnosis)    Disposition:  Discharge  11/7/2020  9:58 am    Follow-up:  [place]

## 2020-11-10 NOTE — TELEPHONE ENCOUNTER
Patient did get message to hold contrave for now. I called [facility] today to let them know not to fill contrave and left on hold for 15 min without human being answering. I called back and left message on their voicemail NOT to fill contrave.

## 2020-11-12 ENCOUNTER — TELEPHONE (OUTPATIENT)
Dept: INTERNAL MEDICINE CLINIC | Facility: CLINIC | Age: 25
End: 2020-11-12

## 2020-11-12 NOTE — TELEPHONE ENCOUNTER
To Dr. Lee Ann Juarez - see pt message below. Pt states pain has worsened - muscle relaxant, nsaids no relief. Can barely turn body, coughing is excruciating - pt states pain: 10/10. Pt wondering if she needs MRI - pt declined appt with you due to work.   Pt w

## 2020-11-12 NOTE — TELEPHONE ENCOUNTER
Please call pt, was seen at  on 11/7/20, since then pain in left ribs has gotten much worse  Pt feels this is muscular, no injury, pt was coughing very hard previously, that has cleared  Tasked to nursing

## 2020-11-13 ENCOUNTER — HOSPITAL ENCOUNTER (EMERGENCY)
Facility: HOSPITAL | Age: 25
Discharge: HOME | End: 2020-11-13
Attending: EMERGENCY MEDICINE
Payer: COMMERCIAL

## 2020-11-13 ENCOUNTER — APPOINTMENT (OUTPATIENT)
Dept: CT IMAGING | Facility: HOSPITAL | Age: 25
End: 2020-11-13
Attending: EMERGENCY MEDICINE
Payer: COMMERCIAL

## 2020-11-13 VITALS
RESPIRATION RATE: 18 BRPM | HEART RATE: 78 BPM | BODY MASS INDEX: 30.73 KG/M2 | SYSTOLIC BLOOD PRESSURE: 126 MMHG | TEMPERATURE: 97 F | OXYGEN SATURATION: 99 % | WEIGHT: 180 LBS | HEIGHT: 64 IN | DIASTOLIC BLOOD PRESSURE: 66 MMHG

## 2020-11-13 DIAGNOSIS — R07.9 LEFT-SIDED CHEST PAIN: Primary | ICD-10-CM

## 2020-11-13 PROCEDURE — 96374 THER/PROPH/DIAG INJ IV PUSH: CPT

## 2020-11-13 PROCEDURE — 85025 COMPLETE CBC W/AUTO DIFF WBC: CPT | Performed by: EMERGENCY MEDICINE

## 2020-11-13 PROCEDURE — 93010 ELECTROCARDIOGRAM REPORT: CPT | Performed by: EMERGENCY MEDICINE

## 2020-11-13 PROCEDURE — 99285 EMERGENCY DEPT VISIT HI MDM: CPT

## 2020-11-13 PROCEDURE — 71260 CT THORAX DX C+: CPT | Performed by: EMERGENCY MEDICINE

## 2020-11-13 PROCEDURE — 93005 ELECTROCARDIOGRAM TRACING: CPT

## 2020-11-13 PROCEDURE — 84484 ASSAY OF TROPONIN QUANT: CPT | Performed by: EMERGENCY MEDICINE

## 2020-11-13 PROCEDURE — 80048 BASIC METABOLIC PNL TOTAL CA: CPT | Performed by: EMERGENCY MEDICINE

## 2020-11-13 RX ORDER — HYDROCODONE BITARTRATE AND ACETAMINOPHEN 5; 325 MG/1; MG/1
1-2 TABLET ORAL EVERY 6 HOURS PRN
Qty: 10 TABLET | Refills: 0 | Status: SHIPPED | OUTPATIENT
Start: 2020-11-13 | End: 2020-11-20

## 2020-11-13 RX ORDER — KETOROLAC TROMETHAMINE 10 MG/1
10 TABLET, FILM COATED ORAL EVERY 8 HOURS PRN
Qty: 10 TABLET | Refills: 0 | Status: SHIPPED | OUTPATIENT
Start: 2020-11-13 | End: 2020-11-20

## 2020-11-13 RX ORDER — KETOROLAC TROMETHAMINE 30 MG/ML
30 INJECTION, SOLUTION INTRAMUSCULAR; INTRAVENOUS ONCE
Status: COMPLETED | OUTPATIENT
Start: 2020-11-13 | End: 2020-11-13

## 2020-11-13 NOTE — ED PROVIDER NOTES
Patient Seen in: [facility] Emergency Department      History   Patient presents with:  Chest Pain Angina  Abdomen/Flank Pain    Stated Complaint: chest pain and abdominal pain x1 week    HPI    31year old female with h/o anxiety, back pain, fibr ELECTROCARDIOGRAM, COMPLETE  01-    SCANNED TO MEDIA TAB - DOS 01-   • FOOT SURGERY Bilateral 2011    foot aspiration of cysts on both feet   • OTHER SURGICAL HISTORY  2012    ganglion cyst removal, both feet   • OTHER SURGICAL HISTORY Right respiratory distress. Breath sounds: Normal breath sounds. No wheezing. Chest:      Chest wall: Tenderness present. No swelling, crepitus or edema. Abdominal:      General: There is no distension. Palpations: Abdomen is soft.       [place]  findings discussed with patient including need for follow up    Medications   ketorolac tromethamine (TORADOL) 30 MG/ML injection 30 mg (30 mg Intravenous Given 11/13/20 0332)   iopamidol (ISOVUE-M) 76 % injection 100 mL (80 mL Intravenous Given 11/13/20 1

## 2020-11-13 NOTE — TELEPHONE ENCOUNTER
One week h/o pain to left ribs; Taking ibuprofen 600 mg po q6hrs, and Skelaxin 800 mg po TID prn.  And lidocaine patches prn; pain worsening    CXR and XR left ribs were neg for fracture on 11/7    Saw chiropractor today; pt underwent heat therapy    Im

## 2020-11-13 NOTE — ED INITIAL ASSESSMENT (HPI)
Patientpresents to ED with c/o of LUQ/chest pain x 9 days. Patient states she went to IC, Xrays were performed, was prescribed muscle relaxer, and lidocaine patch. Also taking ibuprofen. Patient states no relief.  Everything makes pain worse, nothing makes p

## 2020-11-13 NOTE — TELEPHONE ENCOUNTER
Spoke to patient and relayed MD message, patient verbalized understanding.  Patient states she went to the chiropractor today who is trying to use red light therapy on her and when she felt her rib cage underneath her breast she was told something felt funn

## 2020-11-25 ENCOUNTER — PATIENT MESSAGE (OUTPATIENT)
Dept: INTERNAL MEDICINE CLINIC | Facility: CLINIC | Age: 25
End: 2020-11-25

## 2020-11-27 ENCOUNTER — TELEPHONE (OUTPATIENT)
Dept: INTERNAL MEDICINE CLINIC | Facility: CLINIC | Age: 25
End: 2020-11-27

## 2020-11-27 NOTE — TELEPHONE ENCOUNTER
CT chest on 11/13 reviewed; \"atelectasis\" is \"conpressed lung tissue\" and does not need any additional testing; the \"opacity in right middle lobe of lung\" also suspected to be atelectasis    No additional imaging is recommended    Please call pt

## 2020-11-27 NOTE — TELEPHONE ENCOUNTER
Magalie Acevedo  to Eliecer Bolden MD          11/25/20 7:07 PM  Hi,     I was just looking over my recent CT chest results and see that it says mild bibasilar atelectasis and also something about an opacity in the right middle lobe.  Are these things

## 2020-11-27 NOTE — TELEPHONE ENCOUNTER
From: Lucien Adame  To:  Liz Beyer MD  Sent: 11/25/2020 7:07 PM CST  Subject: Test Results Question    Hi,    I was just looking over my recent CT chest results and see that it says mild bibasilar atelectasis and also something about an opacity i

## 2020-11-27 NOTE — TELEPHONE ENCOUNTER
I spoke with patient and relayed Dr. Ila Funes message. Patient asks if the lung is partially collapsed and asks if there is anything she can do because she is still having pain. Explained that I would relay her concern to Dr. Jalen Bone.  She verbalized und

## 2020-12-04 NOTE — TELEPHONE ENCOUNTER
Pt notified   Per dr Elizabeth Sheldon   Deep breathing exercises would improve the atelectasis  Pt states she is still having some mild chest discomfort - if these sx persist - may want to make appt to see R O

## 2020-12-17 ENCOUNTER — TELEMEDICINE (OUTPATIENT)
Dept: INTERNAL MEDICINE CLINIC | Facility: CLINIC | Age: 25
End: 2020-12-17
Payer: COMMERCIAL

## 2020-12-17 DIAGNOSIS — E66.9 OBESITY (BMI 30-39.9): ICD-10-CM

## 2020-12-17 DIAGNOSIS — G47.09 OTHER INSOMNIA: ICD-10-CM

## 2020-12-17 DIAGNOSIS — Z51.81 THERAPEUTIC DRUG MONITORING: Primary | ICD-10-CM

## 2020-12-17 DIAGNOSIS — R53.83 OTHER FATIGUE: ICD-10-CM

## 2020-12-17 DIAGNOSIS — Z72.3 LACK OF PHYSICAL ACTIVITY: ICD-10-CM

## 2020-12-17 DIAGNOSIS — F41.9 ANXIETY: ICD-10-CM

## 2020-12-17 PROCEDURE — 99214 OFFICE O/P EST MOD 30 MIN: CPT | Performed by: INTERNAL MEDICINE

## 2020-12-17 RX ORDER — PHENTERMINE HYDROCHLORIDE 37.5 MG/1
37.5 TABLET ORAL
Qty: 30 TABLET | Refills: 1 | Status: SHIPPED | OUTPATIENT
Start: 2020-12-17 | End: 2021-04-29 | Stop reason: ALTCHOICE

## 2020-12-17 NOTE — PROGRESS NOTES
VIRTUAL ENCOUNTER     Jack Lazo verbally consents to a Virtual/Telephone Check-In service on 12/17/20    HISTORY OF PRESENT ILLNESS  Patient presents with:  Weight Check: video visit       Jack Lazo is a 31year old female is being evaluated as 19.0 11/13/2020    CREATSERUM 0.63 11/13/2020    ANIONGAP 6 11/13/2020    GFRNAA 124 11/13/2020    GFRAA 143 11/13/2020    CA 9.4 11/13/2020    OSMOCALC 287 11/13/2020    ALKPHO 69 10/10/2020    AST 17 10/10/2020    ALT 21 10/10/2020    BILT 0.2 10/10/2020 30-39. 9)    Other orders  -     Phentermine HCl 37.5 MG Oral Tab;  Take 1 tablet (37.5 mg total) by mouth every morning before breakfast.        PLAN  · Trial of phentermine 37.5 mg q day   · -advised of side effects and adverse effects of this medication because of restrictions of visitation. There are limitations of this visit as no physical exam could be performed. Every conscious effort was taken to allow for sufficient and adequate time.   This billing was spent on reviewing labs, medications, radiolo

## 2021-01-08 ENCOUNTER — TELEPHONE (OUTPATIENT)
Dept: INTERNAL MEDICINE CLINIC | Facility: CLINIC | Age: 26
End: 2021-01-08

## 2021-01-08 RX ORDER — LORAZEPAM 0.5 MG/1
0.5 TABLET ORAL 2 TIMES DAILY PRN
Qty: 60 TABLET | Refills: 1 | Status: SHIPPED | OUTPATIENT
Start: 2021-01-08

## 2021-01-08 NOTE — TELEPHONE ENCOUNTER
ERx sent for lorazepam 0.5 mg po BID prn, #60, 1 RF; pt due for physical exam in March 2021; please call pt

## 2021-02-04 ENCOUNTER — TELEPHONE (OUTPATIENT)
Dept: INTERNAL MEDICINE CLINIC | Facility: CLINIC | Age: 26
End: 2021-02-04

## 2021-02-04 ENCOUNTER — TELEMEDICINE (OUTPATIENT)
Dept: INTERNAL MEDICINE CLINIC | Facility: CLINIC | Age: 26
End: 2021-02-04

## 2021-02-04 DIAGNOSIS — Z51.81 THERAPEUTIC DRUG MONITORING: Primary | ICD-10-CM

## 2021-02-04 DIAGNOSIS — F50.81 BINGE EATING DISORDER: ICD-10-CM

## 2021-02-04 PROCEDURE — 99213 OFFICE O/P EST LOW 20 MIN: CPT | Performed by: INTERNAL MEDICINE

## 2021-02-04 NOTE — TELEPHONE ENCOUNTER
PA requested by Sri for vyvanse on [facility]  [id number]  Tried entering and [facility] does not recognize patient.   I called Sri to obtain PA information  Call [phone]  Adamaris Oliveira is [phone]    I called [employer] and spoke with Huber Hernandez who did take my

## 2021-02-04 NOTE — PROGRESS NOTES
VIRTUAL ENCOUNTER     Dana Solis verbally consents to a Virtual/Telephone Check-In service on 12/17/20    HISTORY OF PRESENT ILLNESS  Patient presents with:  Weight Check: Violet Eugenie is a 31year old female is being evaluated as a vide 11/13/2020    .0 11/13/2020    MPV 8.2 07/15/2018     Lab Results   Component Value Date    GLU 77 11/13/2020    BUN 12 11/13/2020    BUNCREA 19.0 11/13/2020    CREATSERUM 0.63 11/13/2020    ANIONGAP 6 11/13/2020    GFRNAA 124 11/13/2020    GFRAA 14 0. 12-0.015 MG/24HR Vaginal Ring, Insert one ring vaginally x 3 weeks, then remove x1 week, Disp: 3 each, Rfl: 4    No current facility-administered medications on file prior to visit.        ASSESSMENT  Analyzed weight data:       Diagnoses and all orders f communication. This has been done in good oscar to provide continuity of care in the best interest of the provider-patient relationship, due to the ongoing public health crisis/national emergency and because of restrictions of visitation.   There are limit

## 2021-02-25 NOTE — TELEPHONE ENCOUNTER
Scheduled for:  Colon  Provider Name: CHERELLE  Date:  2-21-18  Location:  [address] [place] [address]  Sedation:  MAC  Time:  8:00am, arrival 7:00am  Prep: Colyte  Meds/Allergies Reconciled?:  yes  Diagnosis with codes:  Chronic constipation K59.09, generalized abdominal pain R10.84 Depressed/Anxious/Other

## 2021-03-11 NOTE — TELEPHONE ENCOUNTER
Called patient and relayed DR. LEI message - patient not pregnant or nursing. Transferred to Neto Guadarrama to schedule nurse visit. Order for Tdap entered per DR. Diya Macias
Patient had physical 7/23 with Dr. Eligio Loyola. Entering Mammography program, needs to have booster Tdap. Last Tdap was 6/9/2008. Need before next Monday. Would like to have done this week.     Kari Lopez [phone]  Routed high to clinical.
Routed to on call Dr. Ez Wang for review as Dr. Adolfo Carey is out of the office this week.
To nursing, verify not pregnant or nursing. Then okay for Tdap. Thanks.
sensory intact

## 2021-04-29 ENCOUNTER — OFFICE VISIT (OUTPATIENT)
Dept: INTERNAL MEDICINE CLINIC | Facility: CLINIC | Age: 26
End: 2021-04-29
Payer: COMMERCIAL

## 2021-04-29 VITALS
DIASTOLIC BLOOD PRESSURE: 78 MMHG | OXYGEN SATURATION: 98 % | TEMPERATURE: 98 F | HEART RATE: 82 BPM | BODY MASS INDEX: 32 KG/M2 | SYSTOLIC BLOOD PRESSURE: 118 MMHG | WEIGHT: 184 LBS | RESPIRATION RATE: 16 BRPM

## 2021-04-29 DIAGNOSIS — M79.7 FIBROMYALGIA: ICD-10-CM

## 2021-04-29 DIAGNOSIS — M25.521 RIGHT ELBOW PAIN: Primary | ICD-10-CM

## 2021-04-29 PROCEDURE — 3074F SYST BP LT 130 MM HG: CPT | Performed by: INTERNAL MEDICINE

## 2021-04-29 PROCEDURE — 99214 OFFICE O/P EST MOD 30 MIN: CPT | Performed by: INTERNAL MEDICINE

## 2021-04-29 PROCEDURE — 3078F DIAST BP <80 MM HG: CPT | Performed by: INTERNAL MEDICINE

## 2021-04-29 RX ORDER — NAPROXEN 500 MG/1
500 TABLET ORAL 2 TIMES DAILY WITH MEALS
Qty: 60 TABLET | Refills: 1 | Status: SHIPPED | OUTPATIENT
Start: 2021-04-29 | End: 2021-08-10

## 2021-04-29 NOTE — PROGRESS NOTES
Chief Complaint:   Patient presents with:  Elbow Pain: Pt c/o R elbow pain x1 week. Denies injury, but has been doing a lot of physical activity.      HPI:     Ms. Alonso Benites is a 31year old female PMHx of anxiety, fibromyalgia, insomnia, IBS, GERD who presen HISTORY Right     Right ear drum rupture     Social History:  Social History    Tobacco Use      Smoking status: Never Smoker      Smokeless tobacco: Never Used      Tobacco comment: No household smokers.      Vaping Use      Vaping Use: Never used    Alcoh SOB, PND, Dyspnea on Exertion, Orthopnea, Claudication, Edema, Palpitations  Respiratory: Cough, Sputum, Wheezing, Shortness of breath  Gastrointestinal: Nausea, Vomiting, Diarrhea, Constipation, Pain, Heartburn, Dysphagia, Bloody stools, Tarry stools  Gen most recent set of pertinent bloodwork from 11/13/2020  - CBC  - BMP WNL        ASSESSMENT AND PLAN:       R Elbow pain  Musculoskeletal in origin, likely related to overexertion from pulling weeds 1 week ago.   Constant pain, notable swelling on examinatio

## 2021-04-29 NOTE — PATIENT INSTRUCTIONS
You were seen in clinic for R elbow pain. This was likely due to muscle strain, tendinitis, severe episode of lateral epicondylitis (tennis elbow). We should escalate the conservative management:    - resting, icing the area.  Pain control with Naproxen

## 2021-04-30 ENCOUNTER — HOSPITAL ENCOUNTER (OUTPATIENT)
Dept: GENERAL RADIOLOGY | Age: 26
Discharge: HOME | End: 2021-04-30
Attending: INTERNAL MEDICINE
Payer: COMMERCIAL

## 2021-04-30 DIAGNOSIS — M25.521 RIGHT ELBOW PAIN: ICD-10-CM

## 2021-04-30 PROCEDURE — 73080 X-RAY EXAM OF ELBOW: CPT | Performed by: INTERNAL MEDICINE

## 2021-05-02 ENCOUNTER — TELEPHONE (OUTPATIENT)
Dept: INTERNAL MEDICINE CLINIC | Facility: CLINIC | Age: 26
End: 2021-05-02

## 2021-05-02 NOTE — TELEPHONE ENCOUNTER
I reviewed the x-ray from 4/30/2021:    No evidence of acute displaced fracture or dislocation in the elbow. I called the patient for condition update:    Left voicemail for the patient, okay to leave results on her voicemail.   I will follow up with her

## 2021-05-04 NOTE — TELEPHONE ENCOUNTER
Thank you for the update, am happy to hear that this is improving. She can certainly notify us with any changes. No change in further management for now.

## 2021-05-04 NOTE — TELEPHONE ENCOUNTER
Spoke with patient and relayed Dr. Kindra Shell' message re: xray results and request for condition update on her elbow. Patient reports, \"Everything is getting better slowly. \" Reports she feels stable--has been using Naproxen, ice and rest. Still has pain wit

## 2021-05-04 NOTE — TELEPHONE ENCOUNTER
Called patient for condition update:     BILL HERNANDEZ to pool. Requesting a condition update on her elbow pain.  X-ray was negative

## 2021-05-26 ENCOUNTER — TELEPHONE (OUTPATIENT)
Dept: ORTHOPEDICS CLINIC | Facility: CLINIC | Age: 26
End: 2021-05-26

## 2021-05-26 DIAGNOSIS — M25.551 HIP PAIN, RIGHT: Primary | ICD-10-CM

## 2021-05-26 NOTE — TELEPHONE ENCOUNTER
Patient is scheduled for 6/10/2021 with Dustin Padilla for right hip pain. Please advise if x-rays are needed.

## 2021-05-29 ENCOUNTER — HOSPITAL ENCOUNTER (OUTPATIENT)
Dept: GENERAL RADIOLOGY | Age: 26
Discharge: HOME | End: 2021-05-29
Attending: NURSE PRACTITIONER
Payer: COMMERCIAL

## 2021-05-29 DIAGNOSIS — M25.551 HIP PAIN, RIGHT: ICD-10-CM

## 2021-05-29 PROCEDURE — 73502 X-RAY EXAM HIP UNI 2-3 VIEWS: CPT | Performed by: NURSE PRACTITIONER

## 2021-11-22 ENCOUNTER — OFFICE VISIT (OUTPATIENT)
Dept: INTERNAL MEDICINE CLINIC | Facility: CLINIC | Age: 26
End: 2021-11-22
Payer: COMMERCIAL

## 2021-11-22 VITALS
WEIGHT: 194.19 LBS | SYSTOLIC BLOOD PRESSURE: 114 MMHG | HEART RATE: 79 BPM | OXYGEN SATURATION: 100 % | BODY MASS INDEX: 32.35 KG/M2 | HEIGHT: 65 IN | DIASTOLIC BLOOD PRESSURE: 70 MMHG | TEMPERATURE: 98 F

## 2021-11-22 DIAGNOSIS — M54.50 LOW BACK PAIN, UNSPECIFIED BACK PAIN LATERALITY, UNSPECIFIED CHRONICITY, UNSPECIFIED WHETHER SCIATICA PRESENT: Primary | ICD-10-CM

## 2021-11-22 PROCEDURE — 3008F BODY MASS INDEX DOCD: CPT | Performed by: INTERNAL MEDICINE

## 2021-11-22 PROCEDURE — 3078F DIAST BP <80 MM HG: CPT | Performed by: INTERNAL MEDICINE

## 2021-11-22 PROCEDURE — 99213 OFFICE O/P EST LOW 20 MIN: CPT | Performed by: INTERNAL MEDICINE

## 2021-11-22 PROCEDURE — 3074F SYST BP LT 130 MM HG: CPT | Performed by: INTERNAL MEDICINE

## 2021-11-22 RX ORDER — NAPROXEN 500 MG/1
500 TABLET ORAL 2 TIMES DAILY WITH MEALS
Qty: 30 TABLET | Refills: 2 | Status: SHIPPED | OUTPATIENT
Start: 2021-11-22

## 2021-11-22 RX ORDER — CYCLOBENZAPRINE HCL 10 MG
10 TABLET ORAL 3 TIMES DAILY PRN
Qty: 30 TABLET | Refills: 0 | Status: SHIPPED | OUTPATIENT
Start: 2021-11-22

## 2021-11-22 NOTE — PROGRESS NOTES
HPI:    Patient ID: John Hobson is a 31year old female. Notes right lower back pain x 2 wks. Has progressed to involve low left lower back x 2 days. She denies any radiation of pain down lower extremities.   There has been no numbness or tingling distress. Cardiovascular:      Rate and Rhythm: Normal rate and regular rhythm. Pulmonary:      Effort: Pulmonary effort is normal. No respiratory distress. Breath sounds: Normal breath sounds. Abdominal:      Palpations: Abdomen is soft.       Te

## 2022-01-01 ENCOUNTER — TELEPHONE (OUTPATIENT)
Dept: GASTROENTEROLOGY | Facility: CLINIC | Age: 27
End: 2022-01-01

## 2022-01-01 ENCOUNTER — HOSPITAL ENCOUNTER (EMERGENCY)
Facility: HOSPITAL | Age: 27
Discharge: HOME | End: 2022-01-01
Attending: EMERGENCY MEDICINE
Payer: COMMERCIAL

## 2022-01-01 ENCOUNTER — APPOINTMENT (OUTPATIENT)
Dept: CT IMAGING | Facility: HOSPITAL | Age: 27
End: 2022-01-01
Attending: EMERGENCY MEDICINE
Payer: COMMERCIAL

## 2022-01-01 VITALS
RESPIRATION RATE: 16 BRPM | HEART RATE: 75 BPM | WEIGHT: 187 LBS | BODY MASS INDEX: 31.92 KG/M2 | SYSTOLIC BLOOD PRESSURE: 141 MMHG | OXYGEN SATURATION: 100 % | HEIGHT: 64 IN | TEMPERATURE: 98 F | DIASTOLIC BLOOD PRESSURE: 80 MMHG

## 2022-01-01 DIAGNOSIS — K62.5 RECTAL BLEEDING: Primary | ICD-10-CM

## 2022-01-01 DIAGNOSIS — R10.9 ABDOMINAL PAIN, ACUTE: ICD-10-CM

## 2022-01-01 LAB
ALBUMIN SERPL-MCNC: 4 G/DL (ref 3.4–5)
ALP LIVER SERPL-CCNC: 81 U/L
ALT SERPL-CCNC: 30 U/L
ANION GAP SERPL CALC-SCNC: 5 MMOL/L (ref 0–18)
AST SERPL-CCNC: 21 U/L (ref 15–37)
B-HCG UR QL: NEGATIVE
BASOPHILS # BLD AUTO: 0.03 X10(3) UL (ref 0–0.2)
BASOPHILS NFR BLD AUTO: 0.3 %
BILIRUB DIRECT SERPL-MCNC: <0.1 MG/DL (ref 0–0.2)
BILIRUB SERPL-MCNC: 0.3 MG/DL (ref 0.1–2)
BILIRUB UR QL: NEGATIVE
BUN BLD-MCNC: 9 MG/DL (ref 7–18)
BUN/CREAT SERPL: 15 (ref 10–20)
CALCIUM BLD-MCNC: 9.5 MG/DL (ref 8.5–10.1)
CHLORIDE SERPL-SCNC: 108 MMOL/L (ref 98–112)
CO2 SERPL-SCNC: 28 MMOL/L (ref 21–32)
COLOR UR: YELLOW
CREAT BLD-MCNC: 0.6 MG/DL
DEPRECATED RDW RBC AUTO: 39.2 FL (ref 35.1–46.3)
EOSINOPHIL # BLD AUTO: 0.02 X10(3) UL (ref 0–0.7)
EOSINOPHIL NFR BLD AUTO: 0.2 %
ERYTHROCYTE [DISTWIDTH] IN BLOOD BY AUTOMATED COUNT: 12.3 % (ref 11–15)
GLUCOSE BLD-MCNC: 99 MG/DL (ref 70–99)
GLUCOSE UR-MCNC: NEGATIVE MG/DL
HCT VFR BLD AUTO: 41 %
HGB BLD-MCNC: 13.2 G/DL
HGB UR QL STRIP.AUTO: NEGATIVE
IMM GRANULOCYTES # BLD AUTO: 0.03 X10(3) UL (ref 0–1)
IMM GRANULOCYTES NFR BLD: 0.3 %
KETONES UR-MCNC: NEGATIVE MG/DL
LEUKOCYTE ESTERASE UR QL STRIP.AUTO: NEGATIVE
LIPASE SERPL-CCNC: 48 U/L (ref 73–393)
LYMPHOCYTES # BLD AUTO: 1.48 X10(3) UL (ref 1–4)
LYMPHOCYTES NFR BLD AUTO: 12.4 %
MCH RBC QN AUTO: 27.8 PG (ref 26–34)
MCHC RBC AUTO-ENTMCNC: 32.2 G/DL (ref 31–37)
MCV RBC AUTO: 86.5 FL
MONOCYTES # BLD AUTO: 0.72 X10(3) UL (ref 0.1–1)
MONOCYTES NFR BLD AUTO: 6 %
NEUTROPHILS # BLD AUTO: 9.65 X10 (3) UL (ref 1.5–7.7)
NEUTROPHILS # BLD AUTO: 9.65 X10(3) UL (ref 1.5–7.7)
NEUTROPHILS NFR BLD AUTO: 80.8 %
NITRITE UR QL STRIP.AUTO: NEGATIVE
OSMOLALITY SERPL CALC.SUM OF ELEC: 291 MOSM/KG (ref 275–295)
PH UR: 7 [PH] (ref 5–8)
PLATELET # BLD AUTO: 423 10(3)UL (ref 150–450)
POTASSIUM SERPL-SCNC: 3.9 MMOL/L (ref 3.5–5.1)
PROT SERPL-MCNC: 7.7 G/DL (ref 6.4–8.2)
PROT UR-MCNC: NEGATIVE MG/DL
RBC # BLD AUTO: 4.74 X10(6)UL
SODIUM SERPL-SCNC: 141 MMOL/L (ref 136–145)
SP GR UR STRIP: 1.02 (ref 1–1.03)
UROBILINOGEN UR STRIP-ACNC: <2
WBC # BLD AUTO: 11.9 X10(3) UL (ref 4–11)

## 2022-01-01 PROCEDURE — 96361 HYDRATE IV INFUSION ADD-ON: CPT

## 2022-01-01 PROCEDURE — 80076 HEPATIC FUNCTION PANEL: CPT | Performed by: EMERGENCY MEDICINE

## 2022-01-01 PROCEDURE — 99284 EMERGENCY DEPT VISIT MOD MDM: CPT

## 2022-01-01 PROCEDURE — 81003 URINALYSIS AUTO W/O SCOPE: CPT | Performed by: EMERGENCY MEDICINE

## 2022-01-01 PROCEDURE — 96360 HYDRATION IV INFUSION INIT: CPT

## 2022-01-01 PROCEDURE — 83690 ASSAY OF LIPASE: CPT | Performed by: EMERGENCY MEDICINE

## 2022-01-01 PROCEDURE — 80048 BASIC METABOLIC PNL TOTAL CA: CPT | Performed by: EMERGENCY MEDICINE

## 2022-01-01 PROCEDURE — 81025 URINE PREGNANCY TEST: CPT

## 2022-01-01 PROCEDURE — 74177 CT ABD & PELVIS W/CONTRAST: CPT | Performed by: EMERGENCY MEDICINE

## 2022-01-01 PROCEDURE — 96372 THER/PROPH/DIAG INJ SC/IM: CPT

## 2022-01-01 PROCEDURE — 85025 COMPLETE CBC W/AUTO DIFF WBC: CPT | Performed by: EMERGENCY MEDICINE

## 2022-01-01 RX ORDER — DICYCLOMINE HYDROCHLORIDE 10 MG/ML
20 INJECTION INTRAMUSCULAR ONCE
Status: COMPLETED | OUTPATIENT
Start: 2022-01-01 | End: 2022-01-01

## 2022-01-01 RX ORDER — DICYCLOMINE HYDROCHLORIDE 10 MG/1
10 CAPSULE ORAL 3 TIMES DAILY PRN
Qty: 20 CAPSULE | Refills: 0 | Status: SHIPPED | OUTPATIENT
Start: 2022-01-01 | End: 2022-01-01

## 2022-01-01 RX ORDER — DICYCLOMINE HYDROCHLORIDE 10 MG/1
10 CAPSULE ORAL 3 TIMES DAILY PRN
Qty: 20 CAPSULE | Refills: 0 | Status: SHIPPED | OUTPATIENT
Start: 2022-01-01

## 2022-01-01 NOTE — ED PROVIDER NOTES
Patient Seen in: [facility] AND [facility]      History   Patient presents with:  Nausea/Vomiting/Diarrhea  GI Bleeding    Stated Complaint: GI bleeding     Subjective:   HPI    31year old female with a past medical history of internal hemo TAB - DOS 01-   • FOOT SURGERY Bilateral 2011    foot aspiration of cysts on both feet   • OTHER SURGICAL HISTORY  2012    ganglion cyst removal, both feet   • OTHER SURGICAL HISTORY Right     Right ear drum rupture   • OTHER SURGICAL HISTORY Right quadrant, suprapubic area and left lower quadrant. There is no right CVA tenderness, left CVA tenderness, guarding or rebound. Hernia: No hernia is present. Genitourinary:     Rectum: Normal. No mass, tenderness, anal fissure or external hemorrhoid. DENTON    Cardiac Monitor: Pulse Readings from Last 1 Encounters:  01/01/22 : 75  , sinus, normal for rate and rhythm     Radiology findings: CT ABDOMEN+PELVIS(CONTRAST ONLY)(CPT=74177)    Result Date: 1/1/2022  CONCLUSION:  1.  A slight nonspecific increase in Prescribed:  Discharge Medication List as of 1/1/2022  6:05 PM    START taking these medications    dicyclomine (BENTYL) 10 MG Oral Cap  Take 1 capsule (10 mg total) by mouth 3 (three) times daily as needed (abdominal cramping). , Normal, Disp-20 capsule, R

## 2022-01-01 NOTE — ED INITIAL ASSESSMENT (HPI)
Pt ambulatory into triage with steady gait. Pt c/o diarrhea since 0430 this am which initially was liquid stool and then developed into bright blood. Pt also notes generalized abd pain which feels like cramping/contractions.   Pt denies vomiting, fevers,

## 2022-01-01 NOTE — TELEPHONE ENCOUNTER
GI RNs–    Please call Ms. Guidry to check on her Monday. She was in the ED Saturday night with small volumes of rectal bleeding and what sounds like urgency/frequency.   She was describing multiple very small volumes of blood/stool suggestive of hemorrho

## 2022-01-02 NOTE — ED QUICK NOTES
Patient provided with discharge instructions. Verbalized understanding for plan of care at home and follow up. All questions/ concerns addressed prior to discharge.

## 2022-01-04 ENCOUNTER — LAB ENCOUNTER (OUTPATIENT)
Dept: LAB | Facility: HOSPITAL | Age: 27
End: 2022-01-04
Attending: EMERGENCY MEDICINE
Payer: COMMERCIAL

## 2022-01-04 DIAGNOSIS — K62.5 RECTAL BLEEDING: ICD-10-CM

## 2022-01-04 DIAGNOSIS — R10.9 ABDOMINAL PAIN, ACUTE: ICD-10-CM

## 2022-01-04 PROCEDURE — 87045 FECES CULTURE AEROBIC BACT: CPT

## 2022-01-04 PROCEDURE — 87427 SHIGA-LIKE TOXIN AG IA: CPT

## 2022-01-04 PROCEDURE — 87077 CULTURE AEROBIC IDENTIFY: CPT

## 2022-01-04 PROCEDURE — 87046 STOOL CULTR AEROBIC BACT EA: CPT

## 2022-01-04 NOTE — TELEPHONE ENCOUNTER
I spoke to Saravanan Chin    She states she is feeling so much better    The pain she had been having has decreased in intensity and lasts for only a minute at a time.  The pain has subsided to 2-3 times a day    No further blood in her stool which are now formed

## 2022-01-18 ENCOUNTER — OFFICE VISIT (OUTPATIENT)
Dept: OBGYN CLINIC | Facility: CLINIC | Age: 27
End: 2022-01-18
Payer: COMMERCIAL

## 2022-01-18 VITALS
HEART RATE: 74 BPM | WEIGHT: 186.69 LBS | BODY MASS INDEX: 32 KG/M2 | DIASTOLIC BLOOD PRESSURE: 84 MMHG | SYSTOLIC BLOOD PRESSURE: 122 MMHG

## 2022-01-18 DIAGNOSIS — G47.00 INSOMNIA, UNSPECIFIED TYPE: ICD-10-CM

## 2022-01-18 DIAGNOSIS — N92.6 MISSED MENSES: Primary | ICD-10-CM

## 2022-01-18 PROBLEM — O99.210 OBESITY IN PREGNANCY (HCC): Status: ACTIVE | Noted: 2022-01-18

## 2022-01-18 PROBLEM — O09.891 MEDICATION EXPOSURE, 1ST TRIMESTER (HCC): Status: ACTIVE | Noted: 2022-01-18

## 2022-01-18 PROBLEM — O09.891 MEDICATION EXPOSURE, 1ST TRIMESTER: Status: ACTIVE | Noted: 2022-01-18

## 2022-01-18 PROBLEM — O99.210 OBESITY IN PREGNANCY: Status: ACTIVE | Noted: 2022-01-18

## 2022-01-18 LAB — CONTROL LINE PRESENT WITH A CLEAR BACKGROUND (YES/NO): YES YES/NO

## 2022-01-18 PROCEDURE — 99203 OFFICE O/P NEW LOW 30 MIN: CPT | Performed by: ADVANCED PRACTICE MIDWIFE

## 2022-01-18 PROCEDURE — 3074F SYST BP LT 130 MM HG: CPT | Performed by: ADVANCED PRACTICE MIDWIFE

## 2022-01-18 PROCEDURE — 3079F DIAST BP 80-89 MM HG: CPT | Performed by: ADVANCED PRACTICE MIDWIFE

## 2022-01-18 PROCEDURE — 81025 URINE PREGNANCY TEST: CPT | Performed by: ADVANCED PRACTICE MIDWIFE

## 2022-01-18 RX ORDER — PRENATAL VIT/IRON FUM/FOLIC AC 27MG-0.8MG
1 TABLET ORAL DAILY
COMMUNITY

## 2022-01-18 NOTE — PROGRESS NOTES
Jessica Barahona is a 31year old female.     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 30 tablet 2   • cyclobenzaprine 10 MG Oral Tab Take 1 tablet (10 mg total) by mouth 3 (three) times daily as needed for Muscle spasms.  30 tablet 0   • Clindamycin Phosphate 1 % External Lotion Apply thin layer to face AM and t pregnancy, so recommend use of support stockings with flights longer than 3 hours, movement every 2-3 hours if driving  4. Warning signs reviewed advised to call office if occur.     5.  First Trimester chromosomal screening, genetic screening, free Cell F

## 2022-01-27 ENCOUNTER — TELEPHONE (OUTPATIENT)
Dept: OBGYN CLINIC | Facility: CLINIC | Age: 27
End: 2022-01-27

## 2022-01-27 NOTE — TELEPHONE ENCOUNTER
Patient states she was to receive a call to schedule the nurse education visit because during her last visit the schedule was not available. Patient asking for an update.

## 2022-02-08 ENCOUNTER — NURSE ONLY (OUTPATIENT)
Dept: OBGYN CLINIC | Facility: CLINIC | Age: 27
End: 2022-02-08
Payer: COMMERCIAL

## 2022-02-08 VITALS — WEIGHT: 186 LBS | BODY MASS INDEX: 32 KG/M2

## 2022-02-08 DIAGNOSIS — Z34.90 PREGNANCY, UNSPECIFIED GESTATIONAL AGE: Primary | ICD-10-CM

## 2022-02-08 DIAGNOSIS — F41.9 ANXIETY: ICD-10-CM

## 2022-02-08 RX ORDER — DIPHENHYDRAMINE HCL 25 MG
25 TABLET ORAL NIGHTLY PRN
COMMUNITY

## 2022-02-08 RX ORDER — MULTIVITAMIN WITH IRON
5 TABLET ORAL DAILY
COMMUNITY

## 2022-02-08 NOTE — PROGRESS NOTES
Nurse education complete via phone visit. Spouse Bobo on call. Pt instructed to review folder & handouts. Pt weaning off trazodone & takes 1/8 tablet daily for insomnia. Unsure of chicken pox status. Possibly had as a child. BMI 31. Varicella & Nutrition Consult ordered w/ NOB labs. Desires NIPT w/ [facility]. Has a hx of anxiety. Has been seeing a therapist. Catracho Hagen referral w/ [facility] offered & pt accepted. SHANNAN faxed to  - [facility]. Last 2 paps in Epic. Hx abnormal papx1 followed by normal result. Pt to complete EPDS & JOSE ELIAS at next visit NOB appt scheduled. Pt desires waterbirth    Pt. Has answered NO 5P questions and has NO  risk factors. Pt. Given What pregnant women need to know handout. Pt counseled on ACOG & ACNM guidelines recommending carrier testing. Carrier Testing, including Hemoglobin Evaluation offered through patient & insurance choice of [facility] or [facility]. Pt desires testing, order form signed & pt to  kit & completed order form & complete at Any [facility] Test Now after 10 weeks of pregnancy.

## 2022-02-09 ENCOUNTER — LAB ENCOUNTER (OUTPATIENT)
Dept: LAB | Age: 27
End: 2022-02-09
Attending: ADVANCED PRACTICE MIDWIFE
Payer: COMMERCIAL

## 2022-02-09 DIAGNOSIS — Z34.90 PREGNANCY, UNSPECIFIED GESTATIONAL AGE: ICD-10-CM

## 2022-02-09 DIAGNOSIS — F41.9 ANXIETY: ICD-10-CM

## 2022-02-09 LAB
ANTIBODY SCREEN: NEGATIVE
BASOPHILS # BLD AUTO: 0.04 X10(3) UL (ref 0–0.2)
BASOPHILS NFR BLD AUTO: 0.4 %
EOSINOPHIL # BLD AUTO: 0.03 X10(3) UL (ref 0–0.7)
EOSINOPHIL NFR BLD AUTO: 0.3 %
ERYTHROCYTE [DISTWIDTH] IN BLOOD BY AUTOMATED COUNT: 13.1 %
HBV SURFACE AG SER-ACNC: 0.1 [IU]/L
HBV SURFACE AG SERPL QL IA: NONREACTIVE
HCT VFR BLD AUTO: 38.1 %
HGB BLD-MCNC: 12.1 G/DL
IMM GRANULOCYTES # BLD AUTO: 0.03 X10(3) UL (ref 0–1)
IMM GRANULOCYTES NFR BLD: 0.3 %
LYMPHOCYTES # BLD AUTO: 1.58 X10(3) UL (ref 1–4)
LYMPHOCYTES NFR BLD AUTO: 16.5 %
MCH RBC QN AUTO: 28.1 PG (ref 26–34)
MCHC RBC AUTO-ENTMCNC: 31.8 G/DL (ref 31–37)
MCV RBC AUTO: 88.6 FL
MONOCYTES # BLD AUTO: 0.48 X10(3) UL (ref 0.1–1)
MONOCYTES NFR BLD AUTO: 5 %
NEUTROPHILS # BLD AUTO: 7.41 X10 (3) UL (ref 1.5–7.7)
NEUTROPHILS # BLD AUTO: 7.41 X10(3) UL (ref 1.5–7.7)
NEUTROPHILS NFR BLD AUTO: 77.5 %
RBC # BLD AUTO: 4.3 X10(6)UL
RH BLOOD TYPE: POSITIVE
RUBV IGG SER QL: POSITIVE
RUBV IGG SER-ACNC: 225.5 IU/ML (ref 10–?)
T PALLIDUM AB SER QL IA: NONREACTIVE
WBC # BLD AUTO: 9.6 X10(3) UL (ref 4–11)

## 2022-02-09 PROCEDURE — 87086 URINE CULTURE/COLONY COUNT: CPT

## 2022-02-09 PROCEDURE — 86762 RUBELLA ANTIBODY: CPT

## 2022-02-09 PROCEDURE — 87389 HIV-1 AG W/HIV-1&-2 AB AG IA: CPT

## 2022-02-09 PROCEDURE — 86900 BLOOD TYPING SEROLOGIC ABO: CPT

## 2022-02-09 PROCEDURE — 87340 HEPATITIS B SURFACE AG IA: CPT

## 2022-02-09 PROCEDURE — 85025 COMPLETE CBC W/AUTO DIFF WBC: CPT

## 2022-02-09 PROCEDURE — 86850 RBC ANTIBODY SCREEN: CPT

## 2022-02-09 PROCEDURE — 86787 VARICELLA-ZOSTER ANTIBODY: CPT

## 2022-02-09 PROCEDURE — 86780 TREPONEMA PALLIDUM: CPT

## 2022-02-09 PROCEDURE — 86901 BLOOD TYPING SEROLOGIC RH(D): CPT

## 2022-02-10 ENCOUNTER — HOSPITAL ENCOUNTER (OUTPATIENT)
Dept: ULTRASOUND IMAGING | Age: 27
Discharge: HOME | End: 2022-02-10
Attending: ADVANCED PRACTICE MIDWIFE
Payer: COMMERCIAL

## 2022-02-10 DIAGNOSIS — N92.6 MISSED MENSES: ICD-10-CM

## 2022-02-10 PROCEDURE — 76801 OB US < 14 WKS SINGLE FETUS: CPT | Performed by: ADVANCED PRACTICE MIDWIFE

## 2022-02-10 PROCEDURE — 76817 TRANSVAGINAL US OBSTETRIC: CPT | Performed by: ADVANCED PRACTICE MIDWIFE

## 2022-02-11 LAB — VZV IGG SER IA-ACNC: 2301 (ref 165–?)

## 2022-02-16 ENCOUNTER — ORDER TRANSCRIPTION (OUTPATIENT)
Dept: ADMINISTRATIVE | Facility: HOSPITAL | Age: 27
End: 2022-02-16

## 2022-03-02 ENCOUNTER — TELEPHONE (OUTPATIENT)
Dept: OBGYN CLINIC | Facility: CLINIC | Age: 27
End: 2022-03-02

## 2022-03-03 ENCOUNTER — INITIAL PRENATAL (OUTPATIENT)
Dept: OBGYN CLINIC | Facility: CLINIC | Age: 27
End: 2022-03-03
Payer: COMMERCIAL

## 2022-03-03 VITALS
SYSTOLIC BLOOD PRESSURE: 115 MMHG | DIASTOLIC BLOOD PRESSURE: 78 MMHG | WEIGHT: 187.19 LBS | BODY MASS INDEX: 32 KG/M2 | HEART RATE: 77 BPM

## 2022-03-03 DIAGNOSIS — Z34.01 ENCOUNTER FOR SUPERVISION OF NORMAL FIRST PREGNANCY IN FIRST TRIMESTER: Primary | ICD-10-CM

## 2022-03-03 DIAGNOSIS — Z11.3 SCREEN FOR STD (SEXUALLY TRANSMITTED DISEASE): ICD-10-CM

## 2022-03-03 LAB
BILIRUBIN: NEGATIVE
GLUCOSE (URINE DIPSTICK): NEGATIVE MG/DL
KETONES (URINE DIPSTICK): NEGATIVE MG/DL
LEUKOCYTES: NEGATIVE
MULTISTIX LOT#: NORMAL NUMERIC
NITRITE, URINE: NEGATIVE
OCCULT BLOOD: NEGATIVE
PH, URINE: 7.5 (ref 4.5–8)
SPECIFIC GRAVITY: 1.01 (ref 1–1.03)
URINE-COLOR: YELLOW
UROBILINOGEN,SEMI-QN: 0.2 MG/DL (ref 0–1.9)

## 2022-03-03 PROCEDURE — 81002 URINALYSIS NONAUTO W/O SCOPE: CPT | Performed by: ADVANCED PRACTICE MIDWIFE

## 2022-03-03 PROCEDURE — 3078F DIAST BP <80 MM HG: CPT | Performed by: ADVANCED PRACTICE MIDWIFE

## 2022-03-03 PROCEDURE — 3074F SYST BP LT 130 MM HG: CPT | Performed by: ADVANCED PRACTICE MIDWIFE

## 2022-03-03 NOTE — TELEPHONE ENCOUNTER
Advised pt I cannot speak w/ anyone w/o an SHANNAN. Pt reports upper abd cramp under her 12th rib which radiates towards her back. Worsening a bit but still able to work. Denies any vag bleeding or uterine cramping. Was worried it was her uterus or ovaries. Reassured pt that at 11 weeks, but her uterus & ovaries are below her umbilicus. Had c/o moving a 350lb pt & crying a lot the other day. Working until 6p today. Denies any recent meals high in fat. Pt has appt tomorrow. Advised to take tylenol & warm shower. If pain worsens, pt to go to ER.  Pt verbalized an understanding & agrees w/ plan

## 2022-03-03 NOTE — PROGRESS NOTES
+nausea no vomiting. Some right iliopsoas pain- had a heavy patient at work. Picking up NIPT kit today. Would like to reiterate that we can talk to her  - SHANNAN in chart. RTC 4 weeks.

## 2022-03-04 LAB
C TRACH DNA SPEC QL NAA+PROBE: NEGATIVE
N GONORRHOEA DNA SPEC QL NAA+PROBE: NEGATIVE

## 2022-03-09 ENCOUNTER — TELEPHONE (OUTPATIENT)
Dept: OBGYN CLINIC | Facility: CLINIC | Age: 27
End: 2022-03-09

## 2022-03-14 ENCOUNTER — TELEPHONE (OUTPATIENT)
Dept: OBGYN CLINIC | Facility: CLINIC | Age: 27
End: 2022-03-14

## 2022-03-22 ENCOUNTER — TELEPHONE (OUTPATIENT)
Dept: OBGYN CLINIC | Facility: CLINIC | Age: 27
End: 2022-03-22

## 2022-03-22 NOTE — TELEPHONE ENCOUNTER
Please notify negative carrier screen & negative NIPT. Female if desires to know gender.  Thanks JOVI

## 2022-03-24 ENCOUNTER — TELEPHONE (OUTPATIENT)
Dept: OBGYN CLINIC | Facility: CLINIC | Age: 27
End: 2022-03-24

## 2022-03-24 NOTE — TELEPHONE ENCOUNTER
New OB doctor got medical records for pt but they are missing the following:  -Carrier Screen Results  -Cell Free DNA    Please send via fax  Fax [phone]

## 2022-03-24 NOTE — TELEPHONE ENCOUNTER
Spoke to patient, informed patient normal panorama testing as well as horizon testing. Patient already aware of gender.  Patient verbally understands

## 2022-05-05 ENCOUNTER — OFFICE VISIT (OUTPATIENT)
Dept: OTOLARYNGOLOGY | Facility: CLINIC | Age: 27
End: 2022-05-05
Payer: COMMERCIAL

## 2022-05-05 VITALS — HEIGHT: 64 IN | WEIGHT: 192 LBS | BODY MASS INDEX: 32.78 KG/M2

## 2022-05-05 DIAGNOSIS — J30.89 NON-SEASONAL ALLERGIC RHINITIS DUE TO OTHER ALLERGIC TRIGGER: Primary | ICD-10-CM

## 2022-05-05 DIAGNOSIS — R09.82 POST-NASAL DISCHARGE: ICD-10-CM

## 2022-05-05 PROCEDURE — 3008F BODY MASS INDEX DOCD: CPT | Performed by: SPECIALIST

## 2022-05-05 PROCEDURE — 99202 OFFICE O/P NEW SF 15 MIN: CPT | Performed by: SPECIALIST

## 2022-05-05 RX ORDER — ONDANSETRON 4 MG/1
4 TABLET, ORALLY DISINTEGRATING ORAL EVERY 6 HOURS PRN
COMMUNITY
Start: 2022-03-30

## 2022-05-05 NOTE — PATIENT INSTRUCTIONS
Trial of Zyrtec which is the same as cetirizine 10 mg 1 pill at bedtime. You can also try NeilMed irrigation please use distilled water. Call or follow-up if not better.

## 2022-05-18 ENCOUNTER — HOSPITAL ENCOUNTER (OUTPATIENT)
Age: 27
Discharge: HOME | End: 2022-05-18
Attending: EMERGENCY MEDICINE
Payer: COMMERCIAL

## 2022-05-18 ENCOUNTER — TELEPHONE (OUTPATIENT)
Dept: INTERNAL MEDICINE CLINIC | Facility: HOSPITAL | Age: 27
End: 2022-05-18

## 2022-05-18 ENCOUNTER — TELEPHONE (OUTPATIENT)
Dept: INTERNAL MEDICINE CLINIC | Facility: CLINIC | Age: 27
End: 2022-05-18

## 2022-05-18 VITALS
RESPIRATION RATE: 16 BRPM | DIASTOLIC BLOOD PRESSURE: 74 MMHG | HEIGHT: 64 IN | SYSTOLIC BLOOD PRESSURE: 122 MMHG | TEMPERATURE: 99 F | WEIGHT: 200 LBS | BODY MASS INDEX: 34.15 KG/M2 | OXYGEN SATURATION: 100 % | HEART RATE: 98 BPM

## 2022-05-18 DIAGNOSIS — U07.1 COVID-19: Primary | ICD-10-CM

## 2022-05-18 LAB
ADENOVIRUS PCR:: NOT DETECTED
B PARAPERT DNA SPEC QL NAA+PROBE: NOT DETECTED
B PERT DNA SPEC QL NAA+PROBE: NOT DETECTED
C PNEUM DNA SPEC QL NAA+PROBE: NOT DETECTED
CORONAVIRUS 229E PCR:: NOT DETECTED
CORONAVIRUS HKU1 PCR:: NOT DETECTED
CORONAVIRUS NL63 PCR:: NOT DETECTED
CORONAVIRUS OC43 PCR:: NOT DETECTED
FLUAV RNA SPEC QL NAA+PROBE: NOT DETECTED
FLUBV RNA SPEC QL NAA+PROBE: NOT DETECTED
METAPNEUMOVIRUS PCR:: NOT DETECTED
MYCOPLASMA PNEUMONIA PCR:: NOT DETECTED
PARAINFLUENZA 1 PCR:: NOT DETECTED
PARAINFLUENZA 2 PCR:: NOT DETECTED
PARAINFLUENZA 3 PCR:: NOT DETECTED
PARAINFLUENZA 4 PCR:: NOT DETECTED
RHINOVIRUS/ENTERO PCR:: NOT DETECTED
RSV RNA SPEC QL NAA+PROBE: NOT DETECTED
SARS-COV-2 RNA NPH QL NAA+NON-PROBE: DETECTED
SARS-COV-2 RNA RESP QL NAA+PROBE: DETECTED

## 2022-05-18 PROCEDURE — 99214 OFFICE O/P EST MOD 30 MIN: CPT

## 2022-05-18 PROCEDURE — 0202U NFCT DS 22 TRGT SARS-COV-2: CPT | Performed by: EMERGENCY MEDICINE

## 2022-05-18 PROCEDURE — 99213 OFFICE O/P EST LOW 20 MIN: CPT

## 2022-05-18 NOTE — TELEPHONE ENCOUNTER
Pt. Called stating she is having some covid symptoms. Yesterday she developed cough, chest heaviness, migraine, mucous dripping down throat. Today she developed severe body aches. She is also 22 weeks pregnant. She contacted her OB and was instructed to call here. Looking to get in today for a respiratory panel. She was exposed to a couple of people with the Flu over the last couple of weeks. She did 2 separate rapid covid tests by 2 different manufacturers. One was negative and one was positive. Looking for direction. Pt. Can be reached at [phone].

## 2022-05-18 NOTE — TELEPHONE ENCOUNTER
Had flu vax in fall 2021    Ob/gyne Dr Shantal Rose    Unvaccinated for COVID    Pt states she had flu exposure    She requests flu and COVID testing    Since she needs nasopharyngeal swab for flu/ COVID, advise urgent care evaluation for testing; she will communicate with OB/Gyb Re: treatments options, if positive, as she is 22 weeks pregnant    Pt called; all questions answered

## 2022-05-18 NOTE — TELEPHONE ENCOUNTER
To Dr. Sherly Shah---    Spoke to pt who reports her symptoms started yesterday and have worsened overnight. C/o cough, headache, headache, sore throat, post nasal drip, body aches, chest congestion. Pt is currently 22 weeks pregnant. Denies fever, breathing issues, shortness of breath, nasal congestion, runny nose. Pt reports she completed 2 home covid tests: one was positive and one negative. Pt reports the positive test line was slightly notable. 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 Please advise. Pt requesting respiratory/covid combo panel. Pt has call to employee health, but has not received a call back. She works for [employer]    Please advise thanks!

## 2022-05-19 NOTE — TELEPHONE ENCOUNTER
Results and RTW guidelines:    COVID RESULT:    [x] Viewed by employee in [address] RTW plan and instructions as indicated on triage call. Manager notified. Estimated RTW date:   [x] Discussed with employee   [] Unable to reach by phone. Sent via [employer] message      Test type:    [] Rapid         [x] Alinity         [] Outside test:              [x] Positive  Is employee unvaccinated? Yes [x]   No []          If yes:  Enter Covid Positive Medical exemption on Exemption Spreadsheet    Did you have close contact with someone on your unit while not wearing a mask? (e.g., during meal breaks):  Yes []   No [x]     If yes, who:     - Employee should quarantine at home for at least 5 days (day 1 is day after sx onset) , follow the CDC guidelines for cleaning and                              quarantining; see CDC.gov   -This employee may RTW on day 6 if asymptomatic or mildly symptomatic (with improving symptoms). Call Employee Health on day 5 if unable to return on day 6 after                      symptom onset.    -This employee needs to call Employee Health on day 5 after symptom onset. The employee needs to be cleared by Employee Health. - Monitor symptoms and temperature                 - Notify PCP of result                 - Seek emergent care with worsening symptoms   - If employee is still experiencing severe symptoms on day 5 must make a RTW appt with [facility], Employee will not be cleared if:    1. Has consistent cough, shortness of breath or fatigue that restricts your physical activities    2. Is still feeling \"unwell\"    3. Within 15 days of hospitalization for COVID    4. Within 20 days of intubation for COVID    5.  Still has a fever, vomiting or diarrhea   - Keep communication open with management about RTW and if symptoms worsen                - If outside testing completed, bring a copy of result to RTW appointment           Notes:     RTW PLAN:    [x]   COVID positive results, off work minimum of 5 days from positive test or onset of symptoms (day 0)        On day 5, if asymptomatic or mildly symptomatic (with improving symptoms) may return to work day 6          On day 5, if symptomatic, call Employee Health for RTW screening        [x]  COVID positive result - call Employee Health on day 5 after symptom onset. The employee needs to be cleared by Employee Health to RTW. [] RTW immediately, continue to monitor for sx  [] RTW when sx improve; must be fever free for 24 hours w/o medications, Diarrhea/Vomiting free for 24 hours w/o medications  [] Alinity ordered; continue to monitor sx and call for new/worsening sx.   Discuss RTW guidelines with manager  [] May continue to work  [x] Follow up with PCP  [] Home until further instruction from hotline with Alinity results  INSTRUCTIONS PROVIDED:  [x]  Plan as noted above  []  Length of time to obtain results   [x]  Quarantine instructions  [x]  Masking protocol   []  S/S of worsening infection/condition and importance of prompt medical re-evaluation including when to seek emergency care  [] If symptoms develop, stay home and call hotline for rapid test order    Estimated RTW date:  5/23    [x] The employee voiced understanding of above plan/instructions  [x] Manager Notified

## 2022-08-11 ENCOUNTER — OFFICE VISIT (OUTPATIENT)
Dept: PODIATRY CLINIC | Facility: CLINIC | Age: 27
End: 2022-08-11
Payer: COMMERCIAL

## 2022-08-11 DIAGNOSIS — L60.0 INGROWN TOENAIL OF BOTH FEET: Primary | ICD-10-CM

## 2022-08-11 PROCEDURE — 99202 OFFICE O/P NEW SF 15 MIN: CPT | Performed by: PODIATRIST

## 2022-09-04 ENCOUNTER — HOSPITAL ENCOUNTER (OUTPATIENT)
Age: 27
Discharge: HOME | End: 2022-09-04
Payer: COMMERCIAL

## 2022-09-04 VITALS
OXYGEN SATURATION: 100 % | HEART RATE: 94 BPM | RESPIRATION RATE: 20 BRPM | DIASTOLIC BLOOD PRESSURE: 80 MMHG | TEMPERATURE: 99 F | SYSTOLIC BLOOD PRESSURE: 124 MMHG

## 2022-09-04 DIAGNOSIS — J02.0 STREPTOCOCCAL SORE THROAT: Primary | ICD-10-CM

## 2022-09-04 LAB
S PYO AG THROAT QL: POSITIVE
SARS-COV-2 RNA RESP QL NAA+PROBE: NOT DETECTED

## 2022-09-04 PROCEDURE — 99214 OFFICE O/P EST MOD 30 MIN: CPT

## 2022-09-04 PROCEDURE — 99213 OFFICE O/P EST LOW 20 MIN: CPT

## 2022-09-04 PROCEDURE — 87880 STREP A ASSAY W/OPTIC: CPT

## 2022-09-04 RX ORDER — AMOXICILLIN 500 MG/1
500 TABLET, FILM COATED ORAL 2 TIMES DAILY
Qty: 20 TABLET | Refills: 0 | Status: SHIPPED | OUTPATIENT
Start: 2022-09-04 | End: 2022-09-14

## 2022-09-04 NOTE — ED INITIAL ASSESSMENT (HPI)
Presents with sore throat for 2 days. No fever. Patient is 9 months pregnant. States she had \"green\" diarrhea 10 days ago, resolved, and then having \"yellow\" diarrhea starting 3 days ago. covid test negative 9/2/22.

## 2022-09-24 ENCOUNTER — OFFICE VISIT (OUTPATIENT)
Dept: FAMILY MEDICINE CLINIC | Facility: CLINIC | Age: 27
End: 2022-09-24

## 2022-09-24 VITALS
BODY MASS INDEX: 30.73 KG/M2 | RESPIRATION RATE: 20 BRPM | WEIGHT: 180 LBS | OXYGEN SATURATION: 98 % | SYSTOLIC BLOOD PRESSURE: 120 MMHG | HEART RATE: 84 BPM | HEIGHT: 64 IN | DIASTOLIC BLOOD PRESSURE: 78 MMHG | TEMPERATURE: 97 F

## 2022-09-24 DIAGNOSIS — J02.9 SORE THROAT: Primary | ICD-10-CM

## 2022-09-24 DIAGNOSIS — Z87.09 HISTORY OF STREP PHARYNGITIS: ICD-10-CM

## 2022-09-24 LAB
CONTROL LINE PRESENT WITH A CLEAR BACKGROUND (YES/NO): YES YES/NO
KIT LOT #: NORMAL NUMERIC
OPERATOR ID: NORMAL
POCT LOT NUMBER: NORMAL
RAPID SARS-COV-2 BY PCR: NOT DETECTED
STREP GRP A CUL-SCR: NEGATIVE

## 2022-09-24 PROCEDURE — 3078F DIAST BP <80 MM HG: CPT | Performed by: NURSE PRACTITIONER

## 2022-09-24 PROCEDURE — 87880 STREP A ASSAY W/OPTIC: CPT | Performed by: NURSE PRACTITIONER

## 2022-09-24 PROCEDURE — 87081 CULTURE SCREEN ONLY: CPT | Performed by: NURSE PRACTITIONER

## 2022-09-24 PROCEDURE — 3074F SYST BP LT 130 MM HG: CPT | Performed by: NURSE PRACTITIONER

## 2022-09-24 PROCEDURE — U0002 COVID-19 LAB TEST NON-CDC: HCPCS | Performed by: NURSE PRACTITIONER

## 2022-09-24 PROCEDURE — 3008F BODY MASS INDEX DOCD: CPT | Performed by: NURSE PRACTITIONER

## 2022-09-24 PROCEDURE — 99213 OFFICE O/P EST LOW 20 MIN: CPT | Performed by: NURSE PRACTITIONER

## 2022-09-24 RX ORDER — DOCUSATE SODIUM 100 MG/1
100 CAPSULE, LIQUID FILLED ORAL 2 TIMES DAILY
COMMUNITY
Start: 2022-09-09

## 2022-09-24 RX ORDER — ACETAMINOPHEN 325 MG/1
2 TABLET ORAL EVERY 4 HOURS PRN
COMMUNITY
Start: 2022-09-09

## 2022-10-18 ENCOUNTER — OFFICE VISIT (OUTPATIENT)
Dept: PODIATRY CLINIC | Facility: CLINIC | Age: 27
End: 2022-10-18
Payer: COMMERCIAL

## 2022-10-18 DIAGNOSIS — L60.0 INGROWN TOENAIL OF BOTH FEET: Primary | ICD-10-CM

## 2023-02-21 ENCOUNTER — HOSPITAL ENCOUNTER (OUTPATIENT)
Age: 28
Discharge: HOME | End: 2023-02-21
Payer: COMMERCIAL

## 2023-02-21 VITALS
HEART RATE: 70 BPM | RESPIRATION RATE: 20 BRPM | DIASTOLIC BLOOD PRESSURE: 69 MMHG | OXYGEN SATURATION: 100 % | TEMPERATURE: 98 F | SYSTOLIC BLOOD PRESSURE: 122 MMHG

## 2023-02-21 DIAGNOSIS — J02.9 ACUTE VIRAL PHARYNGITIS: ICD-10-CM

## 2023-02-21 DIAGNOSIS — H66.002 LEFT ACUTE SUPPURATIVE OTITIS MEDIA: Primary | ICD-10-CM

## 2023-02-21 LAB
S PYO AG THROAT QL IA.RAPID: NEGATIVE
SARS-COV-2 RNA RESP QL NAA+PROBE: NOT DETECTED

## 2023-02-21 PROCEDURE — 99213 OFFICE O/P EST LOW 20 MIN: CPT

## 2023-02-21 PROCEDURE — 87651 STREP A DNA AMP PROBE: CPT | Performed by: PHYSICIAN ASSISTANT

## 2023-02-21 RX ORDER — AMOXICILLIN AND CLAVULANATE POTASSIUM 875; 125 MG/1; MG/1
1 TABLET, FILM COATED ORAL 2 TIMES DAILY
Qty: 14 TABLET | Refills: 0 | Status: SHIPPED | OUTPATIENT
Start: 2023-02-21 | End: 2023-02-28

## 2023-02-21 RX ORDER — IBUPROFEN 600 MG/1
TABLET ORAL
Qty: 20 TABLET | Refills: 0 | Status: SHIPPED | OUTPATIENT
Start: 2023-02-21

## 2023-05-11 ENCOUNTER — TELEPHONE (OUTPATIENT)
Dept: INTERNAL MEDICINE CLINIC | Facility: CLINIC | Age: 28
End: 2023-05-11

## 2023-05-11 NOTE — TELEPHONE ENCOUNTER
Patient called for status   She is hoping to be seen in office tomorrow  Patient is taking her daughter to the Dr this afternoon.     Ok to leave a message if she is unable to answer telephone  Tasked to Dr Lidia Medina

## 2023-05-11 NOTE — TELEPHONE ENCOUNTER
Please call patient  Feels that she is getting a cold, sore throat, bit of a runny nose  7 month old has cold  Patient tends to get strep throat, hoping to avoid this  Patient 6 weeks pregnant also  No openings in Dr Harrington Simmonds schedule  Should patient be seen in office?   Tasked to nursing

## 2023-05-11 NOTE — TELEPHONE ENCOUNTER
Respiratory infection triage:    Fever:  [x]  No fever  []  Fever>100.4    Cough:  [] Tight cough  [] Cough with exertion  [] Dry cough  [] Sputum production, Color:     Breathing:  [] Mild shortness of breath interfering with activity  [] Wheezing  [] Pain with deep breathing  [] Using inhaler    Other symptoms:  [x] Sore throat  [] Difficulty swallowing  [] Nasal drainage/congestion  [x] Sinus congestion/pressure:postnasal drip[x] Ear pain: right [] Body aches  [] Poor appetite  [] Loss of sense of smell   [] Loss of sense of taste  []Conjunctivitis? [] Any recent travel? [] Any sick contacts? [] Are you a healthcare worker? ADDITIONAL NOTES:  Please advise - calledpatient with SX starting last night . 8 month daughter also sick . Patient is 6 weeks pregnant . Will do covid test and call back          Notified patient that we will route this message to the doctor and see what their recommendations would be. In the meantime, if anything worsens, they were advised to call back or seek emergent evaluation.

## 2023-06-15 RX ORDER — LORAZEPAM 0.5 MG/1
0.5 TABLET ORAL 2 TIMES DAILY PRN
Qty: 60 TABLET | Refills: 1 | Status: CANCELLED | OUTPATIENT
Start: 2023-06-15

## 2023-06-16 NOTE — TELEPHONE ENCOUNTER
To MD:  The above refill request is for a controlled substance. Please review pended medication order. Print and sign for staff to fax to pharmacy or prescribe electronically. Last office visit: 11/22/2021  Last time refill sent and quantity/refills: 1/8/21 qty 60 plus 1    Dr. Briseyda Packer please see below msg r/e pt's miscarriage/reason she's requesting this.

## 2023-07-31 ENCOUNTER — PATIENT MESSAGE (OUTPATIENT)
Dept: FAMILY MEDICINE CLINIC | Facility: CLINIC | Age: 28
End: 2023-07-31

## 2023-07-31 ENCOUNTER — OFFICE VISIT (OUTPATIENT)
Dept: FAMILY MEDICINE CLINIC | Facility: CLINIC | Age: 28
End: 2023-07-31

## 2023-07-31 VITALS
SYSTOLIC BLOOD PRESSURE: 120 MMHG | WEIGHT: 174 LBS | BODY MASS INDEX: 30 KG/M2 | DIASTOLIC BLOOD PRESSURE: 78 MMHG | HEART RATE: 80 BPM | TEMPERATURE: 98 F

## 2023-07-31 DIAGNOSIS — F41.9 ANXIETY: ICD-10-CM

## 2023-07-31 DIAGNOSIS — B37.0 ORAL THRUSH: ICD-10-CM

## 2023-07-31 DIAGNOSIS — R09.82 POST-NASAL DRIP: Primary | ICD-10-CM

## 2023-07-31 PROBLEM — Z87.19 HISTORY OF IRRITABLE BOWEL SYNDROME: Status: RESOLVED | Noted: 2018-02-07 | Resolved: 2023-07-31

## 2023-07-31 PROBLEM — F41.1 GENERALIZED ANXIETY DISORDER: Status: ACTIVE | Noted: 2020-08-13

## 2023-07-31 PROCEDURE — 99204 OFFICE O/P NEW MOD 45 MIN: CPT | Performed by: FAMILY MEDICINE

## 2023-07-31 PROCEDURE — 3078F DIAST BP <80 MM HG: CPT | Performed by: FAMILY MEDICINE

## 2023-07-31 PROCEDURE — 3074F SYST BP LT 130 MM HG: CPT | Performed by: FAMILY MEDICINE

## 2023-07-31 RX ORDER — LORAZEPAM 0.5 MG/1
0.5 TABLET ORAL DAILY PRN
Qty: 30 TABLET | Refills: 0 | Status: SHIPPED | OUTPATIENT
Start: 2023-07-31

## 2023-07-31 RX ORDER — FLUTICASONE PROPIONATE 50 MCG
2 SPRAY, SUSPENSION (ML) NASAL DAILY
Qty: 1 EACH | Refills: 0 | Status: SHIPPED | OUTPATIENT
Start: 2023-07-31

## 2023-08-22 DIAGNOSIS — R09.82 POST-NASAL DRIP: ICD-10-CM

## 2023-08-24 RX ORDER — FLUTICASONE PROPIONATE 50 MCG
2 SPRAY, SUSPENSION (ML) NASAL DAILY
Qty: 16 ML | Refills: 3 | Status: SHIPPED | OUTPATIENT
Start: 2023-08-24

## 2023-08-24 NOTE — TELEPHONE ENCOUNTER
Refill passed per [facility] protocol. Only 1 was sent at last visit. Please advise.    Requested Prescriptions   Pending Prescriptions Disp Refills    FLUTICASONE PROPIONATE 50 MCG/ACT Nasal Suspension [Pharmacy Med Name: FLUTICASONE PROP 50 MCG SPRAY] 16 mL 0     Sig: SPRAY 2 SPRAYS INTO EACH NOSTRIL EVERY DAY       Allergy Medication Protocol Passed - 8/22/2023  8:31 AM        Passed - In person appointment or virtual visit in the past 12 mos or appointment in next 3 mos     Recent Outpatient Visits              3 weeks ago Post-nasal drip    Harlan Dias MD    Office Visit    10 months ago Ingrown toenail of both feet    Geraldene Christian, [place], [place]    Office Visit    11 months ago Sore throat    [address], [facility], [address] [facility],[address], [facility], APR    Office Visit    1 year ago Ingrown toenail of both feet    [facility], [place], [place], DPM    Office Visit    1 year ago Non-seasonal allergic rhinitis due to other allergic trigger    Timmy Dias MD    Office Visit          Future Appointments         Provider Department Appt Notes    In 1 month MD Lea Marshall [place] Annual physical                    Recent Outpatient Visits              3 weeks ago Post-nasal drip    [address], [facility] , Harlan Prather MD    Office Visit    10 months ago Ingrown toenail of both feet    Geraldene Ashley, [facility]    Office Visit    11 months ago Sore throat    [facility], [address], [facility], APRN    Office Visit    1 year ago Ingrown toenail of both feet    [facility], [place], Mikhail Ricarda, DPM    Office Visit    1 year ago Non-seasonal allergic rhinitis due to other allergic trigger    [address] [facility], Rachel Newman MD    Office Visit            Future Appointments         Provider Department Appt Notes    In 1 month Chong Rojas MD [address] [facility], [facility] physical

## 2024-01-29 ENCOUNTER — VIRTUAL PHONE E/M (OUTPATIENT)
Dept: FAMILY MEDICINE CLINIC | Facility: CLINIC | Age: 29
End: 2024-01-29

## 2024-01-29 ENCOUNTER — TELEPHONE (OUTPATIENT)
Dept: FAMILY MEDICINE CLINIC | Facility: CLINIC | Age: 29
End: 2024-01-29

## 2024-01-29 DIAGNOSIS — F41.9 ANXIETY: ICD-10-CM

## 2024-01-29 RX ORDER — LORAZEPAM 0.5 MG/1
0.5 TABLET ORAL DAILY PRN
Qty: 30 TABLET | Refills: 0 | Status: SHIPPED | OUTPATIENT
Start: 2024-01-29

## 2024-01-29 NOTE — PROGRESS NOTES
Virtual Telephone Check-In    Alyson Guidry verbally consents to a Virtual/Telephone Check-In service on 24.    Patient understands and accepts financial responsibility for any deductible, co-insurance and/or co-pays associated with this service.    Duration of the service: 15 minutes  This telemedicine visit was conducted using live audio and video.     Summary of topics discussed:     + Insomnia in pregnancy.  Advised by OBG the she can occasionally take Ativan during the pregnancy for severe insomnia symptoms.  She has been trying other methods to help with sleep.    Physical Exam:  General: alert, speaking in full sentences, no acute distress  Lungs: respirations sound unlabored, no audible wheezing with speaking.  Neurologic: alert, oriented x3    Assessment and plan:    Anxiety  [place]  Appropriate use discussed with patient.  Advised patient to discontinue medication a week before planned .  - LORazepam 0.5 MG Oral Tab; Take 1 tablet (0.5 mg total) by mouth daily as needed for Anxiety.  Dispense: 30 tablet; Refill: 0      Advised to call back clinic if no improvement in symptoms. Red flags discussed to go to ER.     Kathia Stallworth MD

## 2024-05-15 ENCOUNTER — NURSE TRIAGE (OUTPATIENT)
Dept: FAMILY MEDICINE CLINIC | Facility: CLINIC | Age: 29
End: 2024-05-15

## 2024-05-15 NOTE — TELEPHONE ENCOUNTER
Action Requested: Summary for Provider     []  Critical Lab, Recommendations Needed  [] Need Additional Advice  []   FYI    []   Need Orders  [] Need Medications Sent to Pharmacy  [x]  Other     SUMMARY: ER follow up 24.    Verified name and .    Patient reports over past 2 days has had abdominal pain that is now moved into chest near sternum. She states it is a constant pain and causes pain when she attempts to take deep breaths.     She states she is 8 week post-partum.    Patient was advised per protocol that she needs to be evaluated in the emergency room today.    Patient states she will go to the [facility] emergency room today.      Reason for call: Acute  Onset: Data Unavailable      Reason for Disposition   Constant abdominal pain lasting > 2 hours    Protocols used: Abdominal Pain - Female-A-OH

## 2024-05-16 ENCOUNTER — VIRTUAL PHONE E/M (OUTPATIENT)
Dept: FAMILY MEDICINE CLINIC | Facility: CLINIC | Age: 29
End: 2024-05-16

## 2024-05-16 DIAGNOSIS — M94.0 COSTOCHONDRITIS: Primary | ICD-10-CM

## 2024-05-16 PROCEDURE — 99442 PHONE E/M BY PHYS 11-20 MIN: CPT | Performed by: FAMILY MEDICINE

## 2024-05-16 NOTE — TELEPHONE ENCOUNTER
Spoke to patient, stated she went to [facility] ER -labs,EKG,Gallbladder US-negative, ER Dr reached out to OB and was advised it's not OB related follow up with Primary Dr - believes it's heartburn related  Stated symptoms the same   Please advise

## 2024-05-16 NOTE — H&P
Virtual Telephone Check-In    Alyson Guidry verbally consents to a Virtual/Telephone Check-In service on 05/16/24.    Patient understands and accepts financial responsibility for any deductible, co-insurance and/or co-pays associated with this service.    Duration of the service: 15 minutes  This telemedicine visit was conducted using live audio     Summary of topics discussed:     ER follow up.  ER workup was unremarkable, she was describing a 3 day hx of pain just inferior to sternum, worse with deep breaths, reproducible with palpation.  Has taken Tylenol but no NSAIDs.      Physical Exam:  General: alert, speaking in full sentences, no acute distress  Lungs: respirations sound unlabored, no audible wheezing with speaking.  Neurologic: alert, oriented x3    Assessment and plan:    1. Costochondritis  -Suspect chest wall pain given worsening with deep breaths and reproducibility with palpation.  Would have patient trial of NSAIDs which she has at home.  If no improvement in pain will obtain x-ray or CT scan.      Advised to call back clinic if no improvement in symptoms. Red flags discussed to go to ER.     Kathia Stallworth MD

## 2024-05-26 DIAGNOSIS — R09.82 POST-NASAL DRIP: ICD-10-CM

## 2024-05-28 RX ORDER — FLUTICASONE PROPIONATE 50 MCG
2 SPRAY, SUSPENSION (ML) NASAL DAILY
Qty: 48 ML | Refills: 1 | Status: SHIPPED | OUTPATIENT
Start: 2024-05-28 | End: 2024-05-29

## 2024-05-29 NOTE — TELEPHONE ENCOUNTER
Refill passed per [facility] protocol.   Requested Prescriptions   Pending Prescriptions Disp Refills    FLUTICASONE PROPIONATE 50 MCG/ACT Nasal Suspension [Pharmacy Med Name: FLUTICASONE PROP 50 MCG SPRAY] 48 mL 1     Sig: SPRAY 2 SPRAYS BY NASAL ROUTE DAILY       Allergy Medication Protocol Passed - 5/26/2024 12:00 AM        Passed - In person appointment or virtual visit in the past 12 mos or appointment in next 3 mos     Recent Outpatient Visits              1 week ago Costochondritis    [facility], Kathia Fajardo MD    Virtual Phone E/M    4 months ago Anxiety    [facility], Kathia Fajardo MD    Virtual Phone E/M    10 months ago Post-nasal drip    [facility], Kathia Fajardo MD    Office Visit    1 year ago Ingrown toenail of both feet    [facility] [place]    Office Visit    1 year ago Sore throat    [facility] [place] [facility]    Office Visit                          Recent Outpatient Visits              1 week ago Costochondritis    [facility], Kathia Fajardo MD    Virtual Phone E/M    4 months ago Anxiety    [facility], Kathia Fajardo MD    Virtual Phone E/M    10 months ago [facility]    Office Visit    1 year ago Ingrown toenail of both feet    [facility], Conrad Hercules DPM    Office Visit    1 year ago Sore throat    [facility] [place] [facility]    Office Visit

## 2024-06-20 ENCOUNTER — NURSE TRIAGE (OUTPATIENT)
Dept: FAMILY MEDICINE CLINIC | Facility: CLINIC | Age: 29
End: 2024-06-20

## 2024-06-21 ENCOUNTER — OFFICE VISIT (OUTPATIENT)
Dept: INTERNAL MEDICINE CLINIC | Facility: CLINIC | Age: 29
End: 2024-06-21

## 2024-06-21 VITALS
WEIGHT: 171 LBS | BODY MASS INDEX: 29.19 KG/M2 | SYSTOLIC BLOOD PRESSURE: 114 MMHG | HEART RATE: 71 BPM | DIASTOLIC BLOOD PRESSURE: 68 MMHG | HEIGHT: 64 IN | OXYGEN SATURATION: 99 %

## 2024-06-21 DIAGNOSIS — H61.22 LEFT EAR IMPACTED CERUMEN: Primary | ICD-10-CM

## 2024-06-21 PROCEDURE — 3078F DIAST BP <80 MM HG: CPT

## 2024-06-21 PROCEDURE — 3074F SYST BP LT 130 MM HG: CPT

## 2024-06-21 PROCEDURE — 3008F BODY MASS INDEX DOCD: CPT

## 2024-06-21 PROCEDURE — 99213 OFFICE O/P EST LOW 20 MIN: CPT

## 2024-06-21 NOTE — PROGRESS NOTES
Subjective:   Alyson Guidry is a 29 year old female who presents for Ear Pain (Left ear pain)     Pushed wax in too far with a q-tip last week   Has muffled hearing and pain in the left ear   She has been using debrox drops for the past 3 days without improvement     History/Other:    Chief Complaint Reviewed and Verified  Nursing Notes Reviewed and   Verified  Tobacco Reviewed  Allergies Reviewed  Medications Reviewed    OB Status Reviewed         Tobacco:  She has never smoked tobacco.    Current Outpatient Medications   Medication Sig Dispense Refill    LORazepam 0.5 MG Oral Tab Take 1 tablet (0.5 mg total) by mouth daily as needed for Anxiety. 30 tablet 0    Prenatal 27-0.8 MG Oral Tab Take 1 tablet by mouth daily.      ESCITALOPRAM 5 MG Oral Tab TAKE 1 TABLET BY MOUTH EVERY DAY 90 tablet 0         Review of Systems:  Review of Systems   Constitutional: Negative.    HENT:  Positive for ear pain.    Respiratory: Negative.     Cardiovascular: Negative.    Gastrointestinal: Negative.    Skin: Negative.    Neurological: Negative.          Objective:   /68   Pulse 71   Ht 5' 4\" (1.626 m)   Wt 171 lb (77.6 kg)   LMP 03/31/2023 (Exact Date)   SpO2 99%   BMI 29.35 kg/m²  Estimated body mass index is 29.35 kg/m² as calculated from the following:    Height as of this encounter: 5' 4\" (1.626 m).    Weight as of this encounter: 171 lb (77.6 kg).  Physical Exam  Vitals reviewed.   Constitutional:       General: She is not in acute distress.     Appearance: Normal appearance. She is well-developed.   HENT:      Right Ear: Tympanic membrane normal.      Left Ear: Tympanic membrane normal.      Ears:      Comments: Impacted cerumen on left successfully irrigated   Cardiovascular:      Rate and Rhythm: Normal rate.   Pulmonary:      Effort: Pulmonary effort is normal.   Skin:     General: Skin is warm and dry.   Neurological:      Mental Status: She is alert and oriented to person, place, and time.        Assessment & Plan:   1. Left ear impacted cerumen (Primary)  Ear successfully irrigated and visualized TM is normal in appearance    NEFTALI Patton, 6/21/2024, 2:10 PM

## 2024-08-19 ENCOUNTER — OFFICE VISIT (OUTPATIENT)
Dept: PODIATRY CLINIC | Facility: CLINIC | Age: 29
End: 2024-08-19
Payer: COMMERCIAL

## 2024-08-19 ENCOUNTER — TELEPHONE (OUTPATIENT)
Dept: PODIATRY CLINIC | Facility: CLINIC | Age: 29
End: 2024-08-19

## 2024-08-19 DIAGNOSIS — L60.0 INGROWN TOENAIL OF BOTH FEET: Primary | ICD-10-CM

## 2024-08-19 NOTE — PROGRESS NOTES
[facility]  Progress Note      Alyson Guidry is a 29 year old female.   Chief Complaint   Patient presents with    Ingrown Toenail     bilateral great  toes bilateral borders and 2nd toes medial borders ingrown toenail.              HPI:     Patient is a pleasant 29-year-old female who presents to clinic for bilateral hallux medial ingrown nail borders as well as left second digit ingrown nail border.  Patient relates that she would like to have the ingrown nail procedure done    Allergies: Zolpidem, Dog dander [dander], and Metoclopramide    Current Outpatient Medications   Medication Sig Dispense Refill    LORazepam 0.5 MG Oral Tab Take 1 tablet (0.5 mg total) by mouth daily as needed for Anxiety. 30 tablet 0    Prenatal 27-0.8 MG Oral Tab Take 1 tablet by mouth daily.      ESCITALOPRAM 5 MG Oral Tab TAKE 1 TABLET BY MOUTH EVERY DAY 90 tablet 0      Past Medical History:    Acne    Anxiety state, unspecified    medication, therapist    Back pain    BACK PAIN MUSCLE TIGHTNESSS. SKALAXIN - STOPPED    Body aches    BACLOFEN TID, NO RELIEF-STOPPED. NOT FIBROMYALGIA. RESOLVED WITH INCREASED WATER INTAKE    Cyst of joint of ankle or foot    DRAINED    Dysmenorrhea    OCP's    Dysplastic nevus    compound dysplastic nevus, mild, skin of left temporal hairline, 2014    Fibromyalgia    Ganglion cyst    HEELS, BILATERAL. ASPIRATION 2013    History of chicken pox    as a child    History of migraine headaches    occas tension ha and rare migraines more in past    Insomnia    relieved with trazodone and neurontin    Internal hemorrhoids    Labral tear of hip, degenerative    s/p repair; Dec 2016 right hip; Ortho Dr Mcgovern    Nevus    Mid chest      Past Surgical History:   Procedure Laterality Date    Colonoscopy N/A 2/21/2018    Procedure: COLONOSCOPY;  Surgeon: Stephanie Jaeger MD;  [facility]: [facility]    Electrocardiogram, complete  01-    SCANNED TO MEDIA TAB - DOS 01-    Foot  surgery Bilateral 2011    foot aspiration of cysts on both feet    Other surgical history  2012    ganglion cyst removal, both feet    Other surgical history Right     Right ear drum rupture    Other surgical history Right 2016    R hip arthroscopy      Family History   Problem Relation Age of Onset    Hypertension Mother     Cancer Father         precanc skin lesions on face    Actinic Keratosis Father         possible dysplastic nevi    Hypertension Other     Skin cancer Other     Dementia Other         ALZHEIMERS DISEASE    Diabetes Other     Breast Cancer Paternal Aunt 55    Cancer Paternal Uncle         TESTICULAR CANCER    Other (Other) Maternal Grandmother         insomnia    Cancer Paternal Grandfather       Social History     Socioeconomic History    Marital status:      Spouse name: Bobo Guidry    Number of children: 0    Years of education: 14    Highest education level: Associate degree: occupational, technical, or vocational program   Occupational History    Occupation: mammography   Tobacco Use    Smoking status: Never    Smokeless tobacco: Never    Tobacco comments:     No household smokers.    Vaping Use    Vaping status: Never Used   Substance and Sexual Activity    Alcohol use: Not Currently     Alcohol/week: 0.0 standard drinks of alcohol     Comment: rarely prior to pregnancy    Drug use: No    Sexual activity: Yes     Partners: Male     Birth control/protection: Condom     Comment: same partner   Other Topics Concern     Service No    Caffeine Concern Yes     Comment: COFFEE 1 CUP DAILY    Hobby Hazards No    Stress Concern Yes     Comment: not sleeping     Special Diet No    Exercise No    Seat Belt Yes    Pt has a pacemaker No    Pt has a defibrillator No    Reaction to local anesthetic No           REVIEW OF SYSTEMS:     Denies nause, fever, chills  No calf pain  Denies chest pain or SOB      EXAM:   There were no vitals taken for this visit.  GENERAL: well developed, well  nourished, in no apparent distress  EXTREMITIES:   1. Integument: Normal skin temperature and turgor.  Incurvated bilateral medial hallux nail borders and left second digit medial nail border  2. Vascular: Dorsalis pedis two out of four bilateral and posterior tibial pulses two out of   four bilateral, capillary refill normal.   3. Musculoskeletal: All muscle groups are graded 5 out of 5 in the foot and ankle.   4. Neurological: Normal sharp dull sensation; reflexes normal.             ASSESSMENT AND PLAN:   Diagnoses and all orders for this visit:    Ingrown toenail of both feet        Plan:     Patient seen and examined and findings discussed with patient.  Advised patient that I would recommend a nail avulsion with chemical matricectomy at this time.  Advised patient to book at least a 20-minute appointment to have this procedure done.    The patient indicates understanding of these issues and agrees to the plan.        Carey Koenig DPM

## 2024-08-19 NOTE — TELEPHONE ENCOUNTER
Left hallux and left 2nd digit medial nail borders and right hallux medial nail border. I did inform pt we would do one foot at a time.

## 2024-08-19 NOTE — TELEPHONE ENCOUNTER
Patient calling to schedule procedure for toenail discussed at appointment today. Patient states she was told it can be with any podiatrist since Dr. Koenig will be out to December. Patient states she is available mid-late September preferably after 3pm. Please call.

## 2024-08-27 NOTE — TELEPHONE ENCOUNTER
Called patient and scheduled both procedures-  Right 1st & 2nd toe on 9/9 at 330pm at [place]  Left 1st and 2nd toe on 9/13 at 11:40am at [place]

## 2024-12-18 NOTE — ANESTHESIA POSTPROCEDURE EVALUATION
Patient: Carmen Shore    Procedure Summary     Date:  02/21/18 Room / Location:  [facility] / [facility] ENDO    Anesthesia Start:  4299 Anesthesia Stop:  8523    Procedure:  COLONOSCOPY (N/A ) Diagnosis:       Generalized abdominal pain      Ch
same name as above

## 2025-05-27 ENCOUNTER — LAB ENCOUNTER (OUTPATIENT)
Dept: LAB | Age: 30
End: 2025-05-27
Attending: FAMILY MEDICINE
Payer: COMMERCIAL

## 2025-05-27 ENCOUNTER — HOSPITAL ENCOUNTER (OUTPATIENT)
Dept: GENERAL RADIOLOGY | Age: 30
Discharge: HOME | End: 2025-05-27
Attending: FAMILY MEDICINE
Payer: COMMERCIAL

## 2025-05-27 ENCOUNTER — OFFICE VISIT (OUTPATIENT)
Dept: FAMILY MEDICINE CLINIC | Facility: CLINIC | Age: 30
End: 2025-05-27

## 2025-05-27 VITALS
DIASTOLIC BLOOD PRESSURE: 74 MMHG | BODY MASS INDEX: 33.8 KG/M2 | WEIGHT: 198 LBS | SYSTOLIC BLOOD PRESSURE: 111 MMHG | HEART RATE: 97 BPM | HEIGHT: 64 IN | TEMPERATURE: 97 F | RESPIRATION RATE: 16 BRPM

## 2025-05-27 DIAGNOSIS — R05.1 ACUTE COUGH: ICD-10-CM

## 2025-05-27 DIAGNOSIS — R05.1 ACUTE COUGH: Primary | ICD-10-CM

## 2025-05-27 LAB
BILIRUB UR QL: NEGATIVE
CLARITY UR: CLEAR
COLOR UR: YELLOW
CONTROL LINE PRESENT WITH A CLEAR BACKGROUND (YES/NO): YES YES/NO
GLUCOSE UR-MCNC: NORMAL MG/DL
HGB UR QL STRIP.AUTO: NEGATIVE
KETONES UR-MCNC: NEGATIVE MG/DL
KIT LOT #: NORMAL NUMERIC
LEUKOCYTE ESTERASE UR QL STRIP.AUTO: 25
NITRITE UR QL STRIP.AUTO: NEGATIVE
PH UR: 6.5 [PH] (ref 5–8)
PROT UR-MCNC: 20 MG/DL
SP GR UR STRIP: 1.02 (ref 1–1.03)
STREP GRP A CUL-SCR: NEGATIVE
UROBILINOGEN UR STRIP-ACNC: NORMAL

## 2025-05-27 PROCEDURE — 87637 SARSCOV2&INF A&B&RSV AMP PRB: CPT

## 2025-05-27 PROCEDURE — 3074F SYST BP LT 130 MM HG: CPT | Performed by: FAMILY MEDICINE

## 2025-05-27 PROCEDURE — 3008F BODY MASS INDEX DOCD: CPT | Performed by: FAMILY MEDICINE

## 2025-05-27 PROCEDURE — 71046 X-RAY EXAM CHEST 2 VIEWS: CPT | Performed by: FAMILY MEDICINE

## 2025-05-27 PROCEDURE — 87880 STREP A ASSAY W/OPTIC: CPT | Performed by: FAMILY MEDICINE

## 2025-05-27 PROCEDURE — 87086 URINE CULTURE/COLONY COUNT: CPT | Performed by: FAMILY MEDICINE

## 2025-05-27 PROCEDURE — 81001 URINALYSIS AUTO W/SCOPE: CPT | Performed by: FAMILY MEDICINE

## 2025-05-27 PROCEDURE — 99213 OFFICE O/P EST LOW 20 MIN: CPT | Performed by: FAMILY MEDICINE

## 2025-05-27 PROCEDURE — 3078F DIAST BP <80 MM HG: CPT | Performed by: FAMILY MEDICINE

## 2025-05-27 RX ORDER — ALBUTEROL SULFATE 90 UG/1
2 INHALANT RESPIRATORY (INHALATION) EVERY 6 HOURS PRN
Qty: 1 EACH | Refills: 5 | Status: SHIPPED | OUTPATIENT
Start: 2025-05-27 | End: 2026-05-27

## 2025-05-27 NOTE — PROGRESS NOTES
History of Present Illness  Alyson Guidry is a 30 year old female who presents with severe coughing fits during late pregnancy.    She experiences severe dry coughing fits that began three days ago, causing difficulty breathing, urinary incontinence, and gagging to the point of vomiting. The cough disrupts her sleep and causes headaches. Over-the-counter remedies such as Robitussin, Vicks, elevating the head of the bed, salt water gargles, and honey with tea have not provided relief.    She denies fever, chills, or sneezing. She has a stuffy nose and a sore throat due to the intensity of the coughing. She does not have asthma and does not smoke. She experiences difficulty breathing, attributed to her pregnancy, but does not feel short of breath when walking or sitting without a mask. There is no phlegm, watery or itchy eyes, facial pain, or pressure.    She is almost nine months pregnant, due at the end of June, and is expecting her third child. She has a history of a miscarriage with twins. She recently experienced a death in the family, adding stress to her current situation.    Physical Exam  HEENT: Normal oropharynx.  CHEST: Clear to auscultation bilaterally.    Assessment & Plan  Cough during pregnancy  Acute onset of severe coughing fits for three days, causing dyspnea, gagging, and urinary incontinence. Primarily dry cough with no significant sputum production. Afebrile with no chills or significant sore throat except from coughing. No asthma or smoking history. Differential diagnosis includes viral infection (RSV, influenza, COVID-19), bacterial infection (streptococcal), or pneumonia. Chest x-ray considered safe during pregnancy to rule out pneumonia. Albuterol inhaler considered for symptomatic relief, deemed safe for use during pregnancy. Chronic use of albuterol can cause issues, but not maternal use as it is inhaled. Benzonatate was considered but not prescribed due to limited information on its use  during pregnancy.  - Order RSV, influenza, and COVID-19 swab tests.  - Order chest x-ray.  - Order throat swab for streptococcal infection.  - Prescribe albuterol inhaler for symptomatic relief.    Pregnancy  Currently almost nine months pregnant with a due date at the end of June. No pregnancy-related complications. Discussion about the safety of diagnostic imaging and medications during pregnancy, emphasizing the safety of chest x-rays and albuterol inhaler use. She is aware of the safety of these interventions during pregnancy.    Recording duration: 9 minutes

## 2025-05-28 LAB
FLUAV + FLUBV RNA SPEC NAA+PROBE: NOT DETECTED
FLUAV + FLUBV RNA SPEC NAA+PROBE: NOT DETECTED
RSV RNA SPEC NAA+PROBE: NOT DETECTED
SARS-COV-2 RNA RESP QL NAA+PROBE: NOT DETECTED

## 2025-07-21 ENCOUNTER — TELEPHONE (OUTPATIENT)
Facility: CLINIC | Age: 30
End: 2025-07-21

## 2025-07-21 NOTE — TELEPHONE ENCOUNTER
I just realized that patient has a 10-minute appointment.  She can keep the 10-minute appointment and we will can do the procedure on the same day

## 2025-07-21 NOTE — TELEPHONE ENCOUNTER
Patient last seen on 8/19/24 for ingrown nail appointment. It was discussed at that time that patient would need 20 minute appointment slot. Please see message below from patient and advise if patient needs another follow-up before booking 20 minute nail procedure

## 2025-07-21 NOTE — TELEPHONE ENCOUNTER
S/w patient- I booked her for appointment on 8/22/25, because she states that she would like both big toes done. She also stated that she discuss medial borders of 2nd toes as well at her office visit on 8/19/24. She is wondering if that is possible to do in 1 visit. I made the appointment longer at 30 minutes. She was also wondering if there would be any contraindication with local injection to the toe and her actively breastfeeding.    Please advise if patient could get both big toes and medial borders of 2nd toes at the same visit. I did make the appointment slightly longer at 30 minutes. Please also advise on if local injection would affect breast feeding.

## 2025-07-21 NOTE — TELEPHONE ENCOUNTER
Patient called to ask if she can schedule the procedure to remove the ingrown toe nails or if she needs to have another exam first because that was over a year ago that they last talked about it.   Patient states she was going to schedule but then Dr. Lanza had left on maternity leave and the pt states then she was pregnant herself.  Now pt is ready to have procedure done but scheduled appointment to have an exam incase that is needed first.   Future Appointments   Date Time Provider Department Center   8/8/2025 10:20 AM Carey Koenig DPM [facility]   10/17/2025  3:15 PM Kathia Stallworth MD [facility]     Please advise.

## 2025-07-21 NOTE — TELEPHONE ENCOUNTER
Patient can just schedule a 20-minute visit appointment and we could remove the ingrown toenail on the same visit

## 2025-07-21 NOTE — TELEPHONE ENCOUNTER
My appointment should not be more than 20 min. I don't do 30 min appointments. I can do one foot at a time. Or 2 digits at a time only. Local anesthetics are safe.

## 2025-07-22 DIAGNOSIS — F41.9 ANXIETY: ICD-10-CM

## 2025-07-24 RX ORDER — LORAZEPAM 0.5 MG/1
0.5 TABLET ORAL DAILY PRN
Qty: 30 TABLET | Refills: 0 | Status: SHIPPED | OUTPATIENT
Start: 2025-07-24

## 2025-08-25 ENCOUNTER — TELEMEDICINE (OUTPATIENT)
Dept: FAMILY MEDICINE CLINIC | Facility: CLINIC | Age: 30
End: 2025-08-25

## 2025-08-25 DIAGNOSIS — B37.2 CANDIDAL DERMATITIS: Primary | ICD-10-CM

## 2025-08-25 DIAGNOSIS — R20.8 ALLODYNIA: ICD-10-CM

## 2025-08-25 RX ORDER — FLUCONAZOLE 200 MG/1
200 TABLET ORAL DAILY
Qty: 5 TABLET | Refills: 0 | Status: SHIPPED | OUTPATIENT
Start: 2025-08-25

## 2025-08-29 ENCOUNTER — TELEPHONE (OUTPATIENT)
Dept: PODIATRY CLINIC | Facility: CLINIC | Age: 30
End: 2025-08-29

## 2025-08-29 ENCOUNTER — OFFICE VISIT (OUTPATIENT)
Dept: PODIATRY CLINIC | Facility: CLINIC | Age: 30
End: 2025-08-29

## 2025-08-29 VITALS — SYSTOLIC BLOOD PRESSURE: 119 MMHG | DIASTOLIC BLOOD PRESSURE: 81 MMHG | HEART RATE: 65 BPM

## 2025-08-29 DIAGNOSIS — L60.0 INGROWN TOENAIL OF BOTH FEET: ICD-10-CM

## 2025-08-29 DIAGNOSIS — L60.0 INGROWN LEFT BIG TOENAIL: Primary | ICD-10-CM

## 2025-08-29 PROCEDURE — 99214 OFFICE O/P EST MOD 30 MIN: CPT | Performed by: STUDENT IN AN ORGANIZED HEALTH CARE EDUCATION/TRAINING PROGRAM

## 2025-08-29 PROCEDURE — 3074F SYST BP LT 130 MM HG: CPT | Performed by: STUDENT IN AN ORGANIZED HEALTH CARE EDUCATION/TRAINING PROGRAM

## 2025-08-29 PROCEDURE — 3079F DIAST BP 80-89 MM HG: CPT | Performed by: STUDENT IN AN ORGANIZED HEALTH CARE EDUCATION/TRAINING PROGRAM

## (undated) DIAGNOSIS — F41.9 ANXIETY: ICD-10-CM

## (undated) DIAGNOSIS — Z34.90 PREGNANCY, UNSPECIFIED GESTATIONAL AGE: Primary | ICD-10-CM

## (undated) DEVICE — ENDOSCOPY PACK - LOWER: Brand: MEDLINE INDUSTRIES, INC.

## (undated) DEVICE — Device: Brand: DEFENDO AIR/WATER/SUCTION AND BIOPSY VALVE

## (undated) NOTE — MR AVS SNAPSHOT
[facility] [address]  [phone]               Thank you for choosing us for your health care visit with Chiki Louie DPM.  We are glad to serve you and happy to provide you with this summary of yo Commonly known as:  DESYREL           Venlafaxine HCl  MG Cp24   TAKE ONE CAPSULE BY MOUTH EVERY DAY   Commonly known as:  EFFEXOR-XR                   MyChart     Visit MyChart  You can access your MyChart to more actively manage your health care an

## (undated) NOTE — LETTER
AUTHORIZATION FOR SURGICAL OPERATION OR OTHER PROCEDURE    1. I hereby authorize Kenya Muller, and [facility] staff assigned to my case to perform the following operation and/or procedure at the [facility]:         IUD Removal    2.   My phy Relationship to Patient:           []  Parent    Responsible person                          []  Spouse  In case of minor or                    [] Other  _____________   Incompetent name:  __________________________________________________

## (undated) NOTE — MR AVS SNAPSHOT
[facility] [address] [address]  [phone]               Thank you for choosing us for your health care visit with Irina Wesley MD.  We are glad to serve you and happy to provide you with this summary of your visit.   Ple TAKE 1 TABLET BY MOUTH EVERY 2 HOURS AS NEEDED FOR MIGRAINE (MAXIMUM 200 MG IN 24 HRS). Commonly known as:  IMITREX           TraZODone HCl 50 MG Tabs   TAKE 1 TABLET BY MOUTH EVERY EVENING   What changed:  See the new instructions.    Commonly known as: Eat plenty of low-fat dairy products High fat meats and dairy   Choose whole grain products Foods high in sodium   Water is best for hydration Fast food.    Eat at home when possible     Tips for increasing your physical activity – Adults who are physically

## (undated) NOTE — LETTER
AUTHORIZATION FOR SURGICAL OPERATION OR OTHER PROCEDURE    1. I hereby authorize LING Danielle and [facility] staff assigned to my case to perform the following operation and/or procedure at the [facility]:    Nexplanon Removal    2.   Oh Tejada Relationship to Patient:             Parent    Responsible person                            Spouse  In case of minor or                     Other  _____________   Incompetent name:  __________________________________________________

## (undated) NOTE — LETTER
[facility], [place]  Authorization for Invasive Procedures  1.  I hereby authorize Dr. Chen Cody , my physician and whomever may be designated as the doctor's assistant, to perform the following operation and/or procedure:  Colonoscopy performed for the purposes of advancing medicine, science, and/or education, provided my identity is not revealed. If the procedure has been videotaped, the physician/surgeon will obtain the original videotape.  The hospital will not be responsible for stor My signature below affirms that prior to the time of the procedure, I have explained to the patient and/or her legal representative, the risks and benefits involved in the proposed treatment and any reasonable alternative to the proposed treatment.  I have

## (undated) NOTE — LETTER
10/5/2020              [facility]        [address] [address]         To Whom it May Concern:     Kiran Moseley has been treated by this office for acute bronchitis.   She was seen in urgent care on 9/2720, and a nares COVID

## (undated) NOTE — LETTER
AUTHORIZATION FOR SURGICAL OPERATION OR OTHER PROCEDURE    1.  I hereby authorize Dr. Emerson Canseco and [facility] staff assigned to my case to perform the following operation and/or procedure at the [facility]:    __Neplanon Insertion_______ Patient signature:  ___________________________________________________             Relationship to Patient:           []  Parent    Responsible person                          []  Spouse  In case of minor or                    [] Other  _____________   In

## (undated) NOTE — LETTER
AUTHORIZATION FOR SURGICAL OPERATION OR OTHER PROCEDURE    1.  I hereby authorize EMILE ABRAHAM, and [facility] staff assigned to my case to perform the following operation and/or procedure at the [facility] [place] [address]    __________ Patient signature:  ___________________________________________________             Relationship to Patient:             Parent    Responsible person                            Spouse  In case of minor or                     Other  _____________   Incompet

## (undated) NOTE — ED AVS SNAPSHOT
Lidia Garrison   MRN: [id number]    Department:  [facility] Emergency Department   Date of Visit:  1/6/2020           Disclosure     Insurance plans vary and the physician(s) referred by the ER may not be covered by your plan.  Please contact y CARE PHYSICIAN AT ONCE OR RETURN IMMEDIATELY TO THE EMERGENCY DEPARTMENT. If you have been prescribed any medication(s), please fill your prescription right away and begin taking the medication(s) as directed.   If you believe that any of the medications

## (undated) NOTE — LETTER
[facility]  Administration of Anesthesia  1.  Alphonse Lugo, or _________________________________ acting on her behalf, (Patient) (Dependent/Representative) request to receive anesthesia for my pending procedure/operation/treatmen infections, high spinal block, spinal bleeding, seizure, cardiac arrest and death. 7. AWARENESS: I understand that it is possible (but unlikely) to have explicit memory of events from the operating room while under general anesthesia.   8. ELECTROCONVULSIV unconscious pt /Relationship    My signature below affirms that prior to the time of the procedure, I have explained to the patient and/or his/her guardian, the risks and benefits of undergoing anesthesia, as well as any reasonable alternatives.     _______

## (undated) NOTE — Clinical Note
Dear Lu Vincent MD,    Thank you for allowing me to participate in your patient's care. We appreciate your confidence in their care for your patient.     The patient will find the results of our examination and our treatment recommendations in

## (undated) NOTE — ED AVS SNAPSHOT
Jackelin Lucia   MRN: [id number]    Department:  [facility] Emergency Department   Date of Visit:  3/27/2018           Disclosure     Insurance plans vary and the physician(s) referred by the ER may not be covered by your plan.  Please con CARE PHYSICIAN AT ONCE OR RETURN IMMEDIATELY TO THE EMERGENCY DEPARTMENT. If you have been prescribed any medication(s), please fill your prescription right away and begin taking the medication(s) as directed.   If you believe that any of the medications

## (undated) NOTE — LETTER
AUTHORIZATION FOR SURGICAL OPERATION OR OTHER PROCEDURE    1. I hereby authorize Kenya Muller, and [facility] staff assigned to my case to perform the following operation and/or procedure at the [facility], [facility]:        Nexplanon Removal    2.   Roby Simmonds Relationship to Patient:           []  Parent    Responsible person                          []  Spouse  In case of minor or                    [] Other  _____________   Incompetent name:  __________________________________________________

## (undated) NOTE — LETTER
[facility], [place]  Authorization for Invasive Procedures  1.  I hereby authorize Dr. Pretty, my physician and whomever may be designated as the doctor's assistant, to perform the following operation and/or procedure:  Colon performed for the purposes of advancing medicine, science, and/or education, provided my identity is not revealed. If the procedure has been videotaped, the physician/surgeon will obtain the original videotape.  The hospital will not be responsible for stor My signature below affirms that prior to the time of the procedure, I have explained to the patient and/or her legal representative, the risks and benefits involved in the proposed treatment and any reasonable alternative to the proposed treatment.  I have